# Patient Record
Sex: MALE | Race: WHITE | NOT HISPANIC OR LATINO | Employment: FULL TIME | URBAN - METROPOLITAN AREA
[De-identification: names, ages, dates, MRNs, and addresses within clinical notes are randomized per-mention and may not be internally consistent; named-entity substitution may affect disease eponyms.]

---

## 2020-02-22 ENCOUNTER — OFFICE VISIT (OUTPATIENT)
Dept: INTERNAL MEDICINE | Facility: CLINIC | Age: 55
End: 2020-02-22

## 2020-02-22 VITALS
HEIGHT: 68 IN | BODY MASS INDEX: 44.86 KG/M2 | SYSTOLIC BLOOD PRESSURE: 186 MMHG | HEART RATE: 78 BPM | DIASTOLIC BLOOD PRESSURE: 90 MMHG | OXYGEN SATURATION: 99 % | WEIGHT: 296 LBS

## 2020-02-22 DIAGNOSIS — N52.8 OTHER MALE ERECTILE DYSFUNCTION: ICD-10-CM

## 2020-02-22 DIAGNOSIS — I10 ESSENTIAL HYPERTENSION: ICD-10-CM

## 2020-02-22 DIAGNOSIS — Z79.890 LONG-TERM CURRENT USE OF TESTOSTERONE CYPIONATE: ICD-10-CM

## 2020-02-22 DIAGNOSIS — G47.33 OBSTRUCTIVE SLEEP APNEA: ICD-10-CM

## 2020-02-22 DIAGNOSIS — E78.2 MIXED HYPERLIPIDEMIA: ICD-10-CM

## 2020-02-22 DIAGNOSIS — E11.9 TYPE 2 DIABETES MELLITUS WITHOUT COMPLICATION, WITHOUT LONG-TERM CURRENT USE OF INSULIN (HCC): Primary | ICD-10-CM

## 2020-02-22 DIAGNOSIS — K59.4 PROCTALGIA FUGAX: ICD-10-CM

## 2020-02-22 LAB — HBA1C MFR BLD: 6.9 %

## 2020-02-22 PROCEDURE — 99204 OFFICE O/P NEW MOD 45 MIN: CPT | Performed by: NURSE PRACTITIONER

## 2020-02-22 PROCEDURE — 83036 HEMOGLOBIN GLYCOSYLATED A1C: CPT | Performed by: NURSE PRACTITIONER

## 2020-02-22 RX ORDER — ICOSAPENT ETHYL 1000 MG/1
2 CAPSULE ORAL DAILY
Qty: 180 CAPSULE | Refills: 0 | Status: SHIPPED | OUTPATIENT
Start: 2020-02-22 | End: 2020-09-05 | Stop reason: SDUPTHER

## 2020-02-22 RX ORDER — GLYBURIDE 1.25 MG/1
1.25 TABLET ORAL 2 TIMES DAILY WITH MEALS
Qty: 180 TABLET | Refills: 0 | Status: SHIPPED | OUTPATIENT
Start: 2020-02-22 | End: 2020-09-05 | Stop reason: SDUPTHER

## 2020-02-22 RX ORDER — SILDENAFIL CITRATE 20 MG/1
20 TABLET ORAL AS NEEDED
COMMUNITY
End: 2020-02-22 | Stop reason: SDUPTHER

## 2020-02-22 RX ORDER — LISINOPRIL 20 MG/1
TABLET ORAL
Qty: 270 TABLET | Refills: 0 | Status: SHIPPED | OUTPATIENT
Start: 2020-02-22 | End: 2020-09-05

## 2020-02-22 RX ORDER — FENOFIBRATE 160 MG/1
160 TABLET ORAL DAILY
Qty: 90 TABLET | Refills: 3 | Status: CANCELLED | OUTPATIENT
Start: 2020-02-22

## 2020-02-22 RX ORDER — ATORVASTATIN CALCIUM 20 MG/1
20 TABLET, FILM COATED ORAL NIGHTLY
Qty: 90 TABLET | Refills: 0 | Status: SHIPPED | OUTPATIENT
Start: 2020-02-22 | End: 2020-09-05 | Stop reason: SDUPTHER

## 2020-02-22 RX ORDER — ALBUTEROL SULFATE 2.5 MG/3ML
2.5 SOLUTION RESPIRATORY (INHALATION) EVERY 4 HOURS PRN
COMMUNITY
End: 2020-02-26

## 2020-02-22 RX ORDER — FENOFIBRATE 160 MG/1
160 TABLET ORAL DAILY
COMMUNITY
End: 2020-03-03 | Stop reason: RX

## 2020-02-22 RX ORDER — GLIPIZIDE 2.5 MG/1
1.25 TABLET, EXTENDED RELEASE ORAL DAILY
COMMUNITY
End: 2020-02-22

## 2020-02-22 RX ORDER — TESTOSTERONE 200 MG
PELLET (EA) IMPLANTATION
COMMUNITY

## 2020-02-22 RX ORDER — METFORMIN HYDROCHLORIDE 500 MG/1
2000 TABLET, EXTENDED RELEASE ORAL
Qty: 360 TABLET | Refills: 0 | Status: SHIPPED | OUTPATIENT
Start: 2020-02-22 | End: 2020-09-05 | Stop reason: RX

## 2020-02-22 RX ORDER — ATORVASTATIN CALCIUM 20 MG/1
20 TABLET, FILM COATED ORAL DAILY
COMMUNITY
End: 2020-02-22 | Stop reason: SDUPTHER

## 2020-02-22 RX ORDER — LISINOPRIL 20 MG/1
20 TABLET ORAL 2 TIMES DAILY
COMMUNITY
End: 2020-02-22 | Stop reason: SDUPTHER

## 2020-02-22 RX ORDER — SILDENAFIL CITRATE 20 MG/1
100 TABLET ORAL DAILY PRN
Qty: 90 TABLET | Refills: 2 | Status: SHIPPED | OUTPATIENT
Start: 2020-02-22 | End: 2020-09-05 | Stop reason: SDUPTHER

## 2020-02-22 NOTE — PROGRESS NOTES
"Chief Complaint   Patient presents with   • Establish Care   • Diabetes     dx 2008    • Hyperlipidemia   • Hypertension   • Erectile Dysfunction   • Rectal Pain   • Sleep Apnea       History of Present Illness  54 y.o.male presents for establish care and management of acute chronic conditions.  Prior primary care provider [facility]avan [place].  Prior to that he went to [facility] in [place].    T2DM since 2008, Hgb A1C has always been well maintained in the \"6s\". After not working his HgbA1C went up to 9%. He started exercising and losing weight. Went to a health fair about 2-3 months ago and his HgbA1C was 5.6 %. His current job is sedentary as a .    Last annual physical: doesn't remember    Wears bilateral hearing aids: has not seen ENT, saw \"hearing lab\" at [facility] eye exam: Dec 2019  Last colonoscopy: 2016; one polyp 5 yr return  Endoscopy: 2016; nml  Immunizations current: Flu vaccine UTD, MMR, TDap, Shingrix, Hep A and B. He doesn't know what Pneumonia vaccine he received, but it was a \"while ago\"  Tobacco use: none; has never  Alcohol use: Occassional drinker, 3-4/month    Diabetes   He presents for his follow-up diabetic visit. He has type 2 diabetes mellitus. The initial diagnosis of diabetes was made 12 years ago. His disease course has been stable. Hypoglycemia symptoms include sweats and tremors. Pertinent negatives for hypoglycemia include no dizziness, headaches, hunger or nervousness/anxiousness. (Occasionally has low blood sugars only when he takes his glyburide and doesn't eat.) Pertinent negatives for diabetes include no blurred vision, no chest pain, no fatigue, no foot paresthesias, no foot ulcerations, no polydipsia, no polyphagia, no polyuria and no visual change. There are no hypoglycemic complications. Symptoms are stable. Risk factors for coronary artery disease include diabetes mellitus, dyslipidemia, male sex, obesity, sedentary lifestyle and " "stress. Current diabetic treatment includes oral agent (dual therapy). He is compliant with treatment none of the time. Exercise: has new job, so he has not been able to exericse. There is no change in his home blood glucose trend. (Doesn't really check Blood sugars that often. ) An ACE inhibitor/angiotensin II receptor blocker is being taken. He does not see a podiatrist.Eye exam is current.   Hypertension   This is a chronic problem. The current episode started more than 1 year ago. The problem is uncontrolled. Associated symptoms include sweats. Pertinent negatives include no blurred vision, chest pain, headaches, palpitations, peripheral edema or shortness of breath. Risk factors for coronary artery disease include diabetes mellitus, dyslipidemia, family history, obesity, male gender and sedentary lifestyle. Current antihypertension treatment includes ACE inhibitors. Improvement on treatment: at home it usually runs 120-130s/70s.   Hyperlipidemia   This is a chronic problem. The current episode started more than 1 year ago. Exacerbating diseases include diabetes and obesity. Pertinent negatives include no chest pain, myalgias or shortness of breath. Current antihyperlipidemic treatment includes fibric acid derivatives and statins (Has tried Lovesa that has worked good before).     Low testosterone: Dr. Pacheco has Rxd in the past. He was told that if he took testosterone and Vitamin D it would help control his diabetes.    ED: Has been on sildenafil for 4-5 years. It works well for him. Takes 5 of the 20 mg at a time to equal 100 mg. (Was previously on Viagra 100 mg but it was too expensive). No chest pain, shortness of breath, or headaches. Side effects he experiences are stuffy nose and a \"warm feeling\".    EDUARDO on Bipap, well controlled. Sleeps well. Had recent visit with sleep medicine    Rectal spasms Onset 2016, pt starting have \"pain and muscle spasms in his rectum/anus. Saw GI who diagnosed him with " proctalgia fugax. He uses 2 puffs of albuterol as needed for these spasms.     Review of Systems   Constitutional: Negative for chills, diaphoresis, fatigue and fever.   Eyes: Negative for blurred vision and visual disturbance.   Respiratory: Negative for cough, chest tightness and shortness of breath.    Cardiovascular: Negative for chest pain and palpitations.   Gastrointestinal: Negative for abdominal pain, constipation, diarrhea, nausea, vomiting and GERD.   Endocrine: Negative for polydipsia, polyphagia and polyuria.   Genitourinary: Positive for erectile dysfunction. Negative for difficulty urinating, dysuria, hematuria, urgency and urinary incontinence.   Musculoskeletal: Negative for arthralgias, back pain and myalgias.   Skin: Negative for skin lesions.   Neurological: Positive for tremors. Negative for dizziness, light-headedness and headache.   Psychiatric/Behavioral: Negative for depressed mood. The patient is not nervous/anxious.          [facility]  The following portions of the patient's history were reviewed and updated as appropriate: allergies, current medications, past family history, past medical history, past social history, past surgical history and problem list.     Past Medical History:   Diagnosis Date   • Diabetes mellitus (CMS/HCC)    • ED (erectile dysfunction)    • Hyperlipidemia    • Hypertension    • Rectal spasm    • Sleep apnea       Past Surgical History:   Procedure Laterality Date   • COLONOSCOPY     • HERNIA REPAIR     • SHOULDER ACROMIOCLAVICULAR JOINT REPAIR  2016      No Known Allergies   Social History     Socioeconomic History   • Marital status:      Spouse name: Not on file   • Number of children: Not on file   • Years of education: Not on file   • Highest education level: Not on file   Tobacco Use   • Smoking status: Never Smoker   • Smokeless tobacco: Never Used   Substance and Sexual Activity   • Alcohol use: Never     Frequency: Never   • Drug use: Never   • Sexual  "activity: Yes     Partners: Female     Family History   Adopted: Yes   Family history unknown: Yes            Current Outpatient Medications:   •  albuterol (PROVENTIL) (2.5 MG/3ML) 0.083% nebulizer solution, Take 2.5 mg by nebulization Every 4 (Four) Hours As Needed for Wheezing., Disp: , Rfl:   •  atorvastatin (LIPITOR) 20 MG tablet, Take 1 tablet by mouth Every Night., Disp: 90 tablet, Rfl: 0  •  fenofibrate 160 MG tablet, Take 160 mg by mouth Daily., Disp: , Rfl:   •  lisinopril (PRINIVIL,ZESTRIL) 20 MG tablet, Take 2 tab by mouth in mornings and 1 tab at night; daily. Goal BP <130/80, Disp: 270 tablet, Rfl: 0  •  metFORMIN (GLUCOPHAGE) 500 MG tablet, Take 500 mg by mouth 2 (Two) Times a Day With Meals., Disp: , Rfl:   •  sildenafil (REVATIO) 20 MG tablet, Take 5 tablets by mouth Daily As Needed (ED)., Disp: 90 tablet, Rfl: 2  •  Testosterone 200 MG pellet, by Implant route., Disp: , Rfl:   •  glyburide (DIAbeta) 1.25 MG tablet, Take 1 tablet by mouth 2 (Two) Times a Day With Meals., Disp: 180 tablet, Rfl: 0  •  icosapent ethyl (VASCEPA) 1 g capsule capsule, Take 2 g by mouth Daily., Disp: 180 capsule, Rfl: 0  •  metFORMIN ER (GLUCOPHAGE-XR) 500 MG 24 hr tablet, Take 4 tablets by mouth Daily With Breakfast., Disp: 360 tablet, Rfl: 0    VITALS:  BP (!) 186/90   Pulse 78   Ht 172.7 cm (68\")   Wt 134 kg (296 lb)   SpO2 99%   BMI 45.01 kg/m²   Recheck 161/89  Physical Exam   Constitutional: He is oriented to person, place, and time. He appears well-developed and well-nourished. No distress.   HENT:   Head: Normocephalic and atraumatic.   Right Ear: Tympanic membrane, external ear and ear canal normal.   Left Ear: Tympanic membrane, external ear and ear canal normal.   Nose: Nose normal.   Mouth/Throat: Oropharynx is clear and moist.   Bilateral hearing aids   Eyes: Pupils are equal, round, and reactive to light. Conjunctivae and EOM are normal. Right eye exhibits no discharge. Left eye exhibits no discharge. "   Neck: Normal range of motion. Neck supple. No thyromegaly present.   Cardiovascular: Normal rate, regular rhythm, normal heart sounds and intact distal pulses.   No edema   Pulmonary/Chest: Effort normal and breath sounds normal. No respiratory distress.   Abdominal: Soft. Bowel sounds are normal. There is no tenderness.   Lymphadenopathy:     He has no cervical adenopathy.   Neurological: He is alert and oriented to person, place, and time.   Skin: Skin is warm and dry. Capillary refill takes less than 2 seconds. No rash noted.   Psychiatric: He has a normal mood and affect. His behavior is normal.   Vitals reviewed.      LABS  Results for orders placed or performed in visit on 02/22/20   POC Glycosylated Hemoglobin (Hb A1C)   Result Value Ref Range    Hemoglobin A1C 6.9 %       ASSESSMENT/PLAN  Harpreet was seen today for establish care, diabetes, hyperlipidemia, hypertension, erectile dysfunction, rectal pain and sleep apnea.    Diagnoses and all orders for this visit:    Type 2 diabetes mellitus without complication, without long-term current use of insulin (CMS/[facility])  Comments:  Continue current dose of medication. Avoid sugary foods/drinks, limit carbs. Goal A1C <6.5%  Orders:  -     POC Glycosylated Hemoglobin (Hb A1C)  -     lisinopril (PRINIVIL,ZESTRIL) 20 MG tablet; Take 2 tab by mouth in mornings and 1 tab at night; daily.  -     metFORMIN ER (GLUCOPHAGE-XR) 500 MG 24 hr tablet; Take 4 tablets by mouth Daily With Breakfast.  -     glyburide (DIAbeta) 1.25 MG tablet; Take 1 tablet by mouth 2 (Two) Times a Day With Meals.  -     Comprehensive Metabolic Panel; Future  -     Microalbumin / Creatinine Urine Ratio - Urine, Clean Catch  -     Lancets (ONETOUCH ULTRASOFT) lancets; Use as instructed to check blood glucose 3 times per day.    Essential hypertension  Comments:  Uncontrolled. Increased Lisinopril. DASH diet information given, recommended exercise of 150 min/week. Low sodium diet <1500mg/day. Goal <  130/80  Orders:  -     lisinopril (PRINIVIL,ZESTRIL) 20 MG tablet; Take 2 tab by mouth in mornings and 1 tab at night; daily. Goal BP <130/80.  Advised pt may increase to 2 tabs in evenings to equal 20mg BID if not at goal BP.  -     Comprehensive Metabolic Panel; Future  -     Microalbumin / Creatinine Urine Ratio - Urine, Clean Catch    Mixed hyperlipidemia  Comments:  Labs pending; will notify pt with results. Avoid fried, fatty foods.  Orders:  -     atorvastatin (LIPITOR) 20 MG tablet; Take 1 tablet by mouth Every Night.  -     Lipid Panel; Future  -     TSH Rfx On Abnormal To Free T4; Future  -     icosapent ethyl (VASCEPA) 1 g capsule capsule; Take 2 g by mouth Daily.  Pt reported improved results while taking lovaza.  Have vascepa sample in office. Will try the vacepa instead of the fenofibrate.  If insurance does not cover will go back to the finofibrate with the statin.  Other male erectile dysfunction  Comments:  Educated pt on red flags of headache, chest pain or SOB. If erection lasts >4 hrs he needs to go to ED. He expressed understanding.  Orders:  -     sildenafil (REVATIO) 20 MG tablet; Take 5 tablets by mouth Daily As Needed (ED).    Obstructive sleep apnea  Cont cpap; FU with sleep medicine.  Proctalgia fugax  Inhaler as previously directed  Long-term current use of testosterone cypionate  -     CBC & Differential; Future  -     Comprehensive Metabolic Panel; Future  -     Lipid Panel; Future  -     PSA Screen; Future  -     Testosterone, free, total  I have explained I do not prescribe testosterone replacement. I would refer to [facility] if he wishes to continue.  Advised of risks benefits.    I discussed the patients findings and my recommendations with patient.  Patient was encouraged to keep me informed of any acute changes, lack of improvement, or any new concerning symptoms.  Patient voiced understanding of all instructions and denied further questions.      FOLLOW-UP  Return in  about 3 months (around 5/22/2020), or if symptoms worsen or fail to improve, for Recheck.    Electronically signed by:    PHILIPPE Escalante  02/22/2020    EMR Dragon/Transcription Disclaimer:  Much of this encounter note is an electronic transcription/translation of spoken language to printed text.  The electronic translation of spoken language may permit erroneous, or at times, nonsensical words or phrases to be inadvertently transcribed.  Although I have reviewed the note for such errors, some may still exist

## 2020-02-22 NOTE — PATIENT INSTRUCTIONS
"DASH Eating Plan  DASH stands for \"Dietary Approaches to Stop Hypertension.\" The DASH eating plan is a healthy eating plan that has been shown to reduce high blood pressure (hypertension). It may also reduce your risk for type 2 diabetes, heart disease, and stroke. The DASH eating plan may also help with weight loss.  What are tips for following this plan?    General guidelines  · Avoid eating more than 2,300 mg (milligrams) of salt (sodium) a day. If you have hypertension, you may need to reduce your sodium intake to 1,500 mg a day.  · Limit alcohol intake to no more than 1 drink a day for nonpregnant women and 2 drinks a day for men. One drink equals 12 oz of beer, 5 oz of wine, or 1½ oz of hard liquor.  · Work with your health care provider to maintain a healthy body weight or to lose weight. Ask what an ideal weight is for you.  · Get at least 30 minutes of exercise that causes your heart to beat faster (aerobic exercise) most days of the week. Activities may include walking, swimming, or biking.  · Work with your health care provider or diet and nutrition specialist (dietitian) to adjust your eating plan to your individual calorie needs.  Reading food labels    · Check food labels for the amount of sodium per serving. Choose foods with less than 5 percent of the Daily Value of sodium. Generally, foods with less than 300 mg of sodium per serving fit into this eating plan.  · To find whole grains, look for the word \"whole\" as the first word in the ingredient list.  Shopping  · Buy products labeled as \"low-sodium\" or \"no salt added.\"  · Buy fresh foods. Avoid canned foods and premade or frozen meals.  Cooking  · Avoid adding salt when cooking. Use salt-free seasonings or herbs instead of table salt or sea salt. Check with your health care provider or pharmacist before using salt substitutes.  · Do not preciado foods. Cook foods using healthy methods such as baking, boiling, grilling, and broiling instead.  · Cook with " heart-healthy oils, such as olive, canola, soybean, or sunflower oil.  Meal planning  · Eat a balanced diet that includes:  ? 5 or more servings of fruits and vegetables each day. At each meal, try to fill half of your plate with fruits and vegetables.  ? Up to 6-8 servings of whole grains each day.  ? Less than 6 oz of lean meat, poultry, or fish each day. A 3-oz serving of meat is about the same size as a deck of cards. One egg equals 1 oz.  ? 2 servings of low-fat dairy each day.  ? A serving of nuts, seeds, or beans 5 times each week.  ? Heart-healthy fats. Healthy fats called Omega-3 fatty acids are found in foods such as flaxseeds and coldwater fish, like sardines, salmon, and mackerel.  · Limit how much you eat of the following:  ? Canned or prepackaged foods.  ? Food that is high in trans fat, such as fried foods.  ? Food that is high in saturated fat, such as fatty meat.  ? Sweets, desserts, sugary drinks, and other foods with added sugar.  ? Full-fat dairy products.  · Do not salt foods before eating.  · Try to eat at least 2 vegetarian meals each week.  · Eat more home-cooked food and less restaurant, buffet, and fast food.  · When eating at a restaurant, ask that your food be prepared with less salt or no salt, if possible.  What foods are recommended?  The items listed may not be a complete list. Talk with your dietitian about what dietary choices are best for you.  Grains  Whole-grain or whole-wheat bread. Whole-grain or whole-wheat pasta. Brown rice. Oatmeal. Quinoa. Bulgur. Whole-grain and low-sodium cereals. Cydney bread. Low-fat, low-sodium crackers. Whole-wheat flour tortillas.  Vegetables  Fresh or frozen vegetables (raw, steamed, roasted, or grilled). Low-sodium or reduced-sodium tomato and vegetable juice. Low-sodium or reduced-sodium tomato sauce and tomato paste. Low-sodium or reduced-sodium canned vegetables.  Fruits  All fresh, dried, or frozen fruit. Canned fruit in natural juice (without  added sugar).  Meat and other protein foods  Skinless chicken or turkey. Ground chicken or turkey. Pork with fat trimmed off. Fish and seafood. Egg whites. Dried beans, peas, or lentils. Unsalted nuts, nut butters, and seeds. Unsalted canned beans. Lean cuts of beef with fat trimmed off. Low-sodium, lean deli meat.  Dairy  Low-fat (1%) or fat-free (skim) milk. Fat-free, low-fat, or reduced-fat cheeses. Nonfat, low-sodium ricotta or cottage cheese. Low-fat or nonfat yogurt. Low-fat, low-sodium cheese.  Fats and oils  Soft margarine without trans fats. Vegetable oil. Low-fat, reduced-fat, or light mayonnaise and salad dressings (reduced-sodium). Canola, safflower, olive, soybean, and sunflower oils. Avocado.  Seasoning and other foods  Herbs. Spices. Seasoning mixes without salt. Unsalted popcorn and pretzels. Fat-free sweets.  What foods are not recommended?  The items listed may not be a complete list. Talk with your dietitian about what dietary choices are best for you.  Grains  Baked goods made with fat, such as croissants, muffins, or some breads. Dry pasta or rice meal packs.  Vegetables  Creamed or fried vegetables. Vegetables in a cheese sauce. Regular canned vegetables (not low-sodium or reduced-sodium). Regular canned tomato sauce and paste (not low-sodium or reduced-sodium). Regular tomato and vegetable juice (not low-sodium or reduced-sodium). Pickles. Olives.  Fruits  Canned fruit in a light or heavy syrup. Fried fruit. Fruit in cream or butter sauce.  Meat and other protein foods  Fatty cuts of meat. Ribs. Fried meat. Hunter. Sausage. Bologna and other processed lunch meats. Salami. Fatback. Hotdogs. Bratwurst. Salted nuts and seeds. Canned beans with added salt. Canned or smoked fish. Whole eggs or egg yolks. Chicken or turkey with skin.  Dairy  Whole or 2% milk, cream, and half-and-half. Whole or full-fat cream cheese. Whole-fat or sweetened yogurt. Full-fat cheese. Nondairy creamers. Whipped toppings.  Processed cheese and cheese spreads.  Fats and oils  Butter. Stick margarine. Lard. Shortening. Ghee. Hunter fat. Tropical oils, such as coconut, palm kernel, or palm oil.  Seasoning and other foods  Salted popcorn and pretzels. Onion salt, garlic salt, seasoned salt, table salt, and sea salt. Worcestershire sauce. Tartar sauce. Barbecue sauce. Teriyaki sauce. Soy sauce, including reduced-sodium. Steak sauce. Canned and packaged gravies. Fish sauce. Oyster sauce. Cocktail sauce. Horseradish that you find on the shelf. Ketchup. Mustard. Meat flavorings and tenderizers. Bouillon cubes. Hot sauce and Tabasco sauce. Premade or packaged marinades. Premade or packaged taco seasonings. Relishes. Regular salad dressings.  Where to find more information:  · National Heart, Lung, and Blood [place]: www.nhlbi.nih.gov  · American Heart Association: www.heart.org  Summary  · The DASH eating plan is a healthy eating plan that has been shown to reduce high blood pressure (hypertension). It may also reduce your risk for type 2 diabetes, heart disease, and stroke.  · With the DASH eating plan, you should limit salt (sodium) intake to 2,300 mg a day. If you have hypertension, you may need to reduce your sodium intake to 1,500 mg a day.  · When on the DASH eating plan, aim to eat more fresh fruits and vegetables, whole grains, lean proteins, low-fat dairy, and heart-healthy fats.  · Work with your health care provider or diet and nutrition specialist (dietitian) to adjust your eating plan to your individual calorie needs.  This information is not intended to replace advice given to you by your health care provider. Make sure you discuss any questions you have with your health care provider.  Document Released: 12/06/2012 Document Revised: 12/11/2017 Document Reviewed: 12/11/2017  [employer] Interactive Patient Education © 2020 [employer]

## 2020-02-23 RX ORDER — LANCETS
EACH MISCELLANEOUS
Qty: 100 EACH | Refills: 12 | Status: SHIPPED | OUTPATIENT
Start: 2020-02-23 | End: 2020-02-28

## 2020-02-24 ENCOUNTER — APPOINTMENT (OUTPATIENT)
Dept: LAB | Facility: HOSPITAL | Age: 55
End: 2020-02-24

## 2020-02-24 DIAGNOSIS — E11.9 TYPE 2 DIABETES MELLITUS WITHOUT COMPLICATION, WITHOUT LONG-TERM CURRENT USE OF INSULIN (HCC): Primary | ICD-10-CM

## 2020-02-24 PROCEDURE — 82043 UR ALBUMIN QUANTITATIVE: CPT | Performed by: NURSE PRACTITIONER

## 2020-02-24 PROCEDURE — 82570 ASSAY OF URINE CREATININE: CPT | Performed by: NURSE PRACTITIONER

## 2020-02-25 LAB
ALBUMIN UR-MCNC: <1.2 MG/DL
CREAT UR-MCNC: 156.8 MG/DL
MICROALBUMIN/CREAT UR: NORMAL MG/G{CREAT}

## 2020-02-26 LAB
ALBUMIN SERPL-MCNC: 4.6 G/DL (ref 3.8–4.9)
ALBUMIN/GLOB SERPL: 1.9 {RATIO} (ref 1.2–2.2)
ALP SERPL-CCNC: 51 IU/L (ref 39–117)
ALT SERPL-CCNC: 37 IU/L (ref 0–44)
AST SERPL-CCNC: 26 IU/L (ref 0–40)
BASOPHILS # BLD AUTO: 0 X10E3/UL (ref 0–0.2)
BASOPHILS NFR BLD AUTO: 0 %
BILIRUB SERPL-MCNC: 0.3 MG/DL (ref 0–1.2)
BUN SERPL-MCNC: 17 MG/DL (ref 6–24)
BUN/CREAT SERPL: 16 (ref 9–20)
CALCIUM SERPL-MCNC: 9.5 MG/DL (ref 8.7–10.2)
CHLORIDE SERPL-SCNC: 100 MMOL/L (ref 96–106)
CHOLEST SERPL-MCNC: 151 MG/DL (ref 100–199)
CO2 SERPL-SCNC: 25 MMOL/L (ref 20–29)
CREAT SERPL-MCNC: 1.04 MG/DL (ref 0.76–1.27)
EOSINOPHIL # BLD AUTO: 0 X10E3/UL (ref 0–0.4)
EOSINOPHIL NFR BLD AUTO: 1 %
ERYTHROCYTE [DISTWIDTH] IN BLOOD BY AUTOMATED COUNT: 13.7 % (ref 11.6–15.4)
GLOBULIN SER CALC-MCNC: 2.4 G/DL (ref 1.5–4.5)
GLUCOSE SERPL-MCNC: 120 MG/DL (ref 65–99)
HCT VFR BLD AUTO: 41.4 % (ref 37.5–51)
HDLC SERPL-MCNC: 38 MG/DL
HGB BLD-MCNC: 13.7 G/DL (ref 13–17.7)
IMM GRANULOCYTES # BLD AUTO: 0 X10E3/UL (ref 0–0.1)
IMM GRANULOCYTES NFR BLD AUTO: 0 %
LDLC SERPL CALC-MCNC: 76 MG/DL (ref 0–99)
LYMPHOCYTES # BLD AUTO: 2 X10E3/UL (ref 0.7–3.1)
LYMPHOCYTES NFR BLD AUTO: 28 %
MCH RBC QN AUTO: 28.2 PG (ref 26.6–33)
MCHC RBC AUTO-ENTMCNC: 33.1 G/DL (ref 31.5–35.7)
MCV RBC AUTO: 85 FL (ref 79–97)
MONOCYTES # BLD AUTO: 0.8 X10E3/UL (ref 0.1–0.9)
MONOCYTES NFR BLD AUTO: 11 %
NEUTROPHILS # BLD AUTO: 4.3 X10E3/UL (ref 1.4–7)
NEUTROPHILS NFR BLD AUTO: 60 %
PLATELET # BLD AUTO: 258 X10E3/UL (ref 150–450)
POTASSIUM SERPL-SCNC: 4.5 MMOL/L (ref 3.5–5.2)
PROT SERPL-MCNC: 7 G/DL (ref 6–8.5)
PSA FREE MFR SERPL: 44.4 %
PSA FREE SERPL-MCNC: 0.4 NG/ML
PSA SERPL-MCNC: 0.9 NG/ML (ref 0–4)
RBC # BLD AUTO: 4.85 X10E6/UL (ref 4.14–5.8)
SODIUM SERPL-SCNC: 139 MMOL/L (ref 134–144)
TRIGL SERPL-MCNC: 184 MG/DL (ref 0–149)
TSH SERPL DL<=0.005 MIU/L-ACNC: 2.11 UIU/ML (ref 0.45–4.5)
VLDLC SERPL CALC-MCNC: 37 MG/DL (ref 5–40)
WBC # BLD AUTO: 7.1 X10E3/UL (ref 3.4–10.8)

## 2020-02-26 RX ORDER — ALBUTEROL SULFATE 90 UG/1
2 AEROSOL, METERED RESPIRATORY (INHALATION) EVERY 4 HOURS PRN
Qty: 1 INHALER | Refills: 11 | Status: SHIPPED | OUTPATIENT
Start: 2020-02-26 | End: 2020-09-08 | Stop reason: SDUPTHER

## 2020-02-27 ENCOUNTER — TELEPHONE (OUTPATIENT)
Dept: INTERNAL MEDICINE | Facility: CLINIC | Age: 55
End: 2020-02-27

## 2020-02-27 NOTE — TELEPHONE ENCOUNTER
Called sasha and his insurance accepts acucheck augustina plus, test and lancets would be soft clcik

## 2020-02-27 NOTE — TELEPHONE ENCOUNTER
----- Message from PHILIPPE Dickerson sent at 2/26/2020  2:37 PM EST -----  Call your pharmacy and ask what glucometer and test strips does passoport cover for pt?

## 2020-02-28 ENCOUNTER — TELEPHONE (OUTPATIENT)
Dept: INTERNAL MEDICINE | Facility: CLINIC | Age: 55
End: 2020-02-28

## 2020-02-28 DIAGNOSIS — E11.9 TYPE 2 DIABETES MELLITUS WITHOUT COMPLICATION, WITHOUT LONG-TERM CURRENT USE OF INSULIN (HCC): ICD-10-CM

## 2020-02-28 RX ORDER — LANCETS
EACH MISCELLANEOUS
Qty: 100 EACH | Refills: 12 | Status: SHIPPED | OUTPATIENT
Start: 2020-02-28 | End: 2021-02-27

## 2020-02-28 RX ORDER — BLOOD-GLUCOSE METER
1 EACH MISCELLANEOUS ONCE
Qty: 1 KIT | Refills: 0 | Status: SHIPPED | OUTPATIENT
Start: 2020-02-28 | End: 2020-02-28

## 2020-02-28 NOTE — TELEPHONE ENCOUNTER
Patient states that the end of the day his health insurance expires.  He needs to see what the status is on the Vascepa authorization.  Since his lab work showed he has high cholesterol should he take a higher dose of Lipitor?  He and be reached at [phone]

## 2020-02-29 ENCOUNTER — TELEPHONE (OUTPATIENT)
Dept: INTERNAL MEDICINE | Facility: CLINIC | Age: 55
End: 2020-02-29

## 2020-05-06 DIAGNOSIS — I10 ESSENTIAL HYPERTENSION: ICD-10-CM

## 2020-05-06 DIAGNOSIS — E78.2 MIXED HYPERLIPIDEMIA: ICD-10-CM

## 2020-05-06 DIAGNOSIS — E11.9 TYPE 2 DIABETES MELLITUS WITHOUT COMPLICATION, WITHOUT LONG-TERM CURRENT USE OF INSULIN (HCC): ICD-10-CM

## 2020-05-06 RX ORDER — LISINOPRIL 20 MG/1
TABLET ORAL
Qty: 270 TABLET | Refills: 0 | OUTPATIENT
Start: 2020-05-06

## 2020-05-06 RX ORDER — METFORMIN HYDROCHLORIDE 500 MG/1
TABLET, EXTENDED RELEASE ORAL
Qty: 360 TABLET | Refills: 0 | OUTPATIENT
Start: 2020-05-06

## 2020-05-06 RX ORDER — ATORVASTATIN CALCIUM 20 MG/1
TABLET, FILM COATED ORAL
Qty: 90 TABLET | Refills: 0 | OUTPATIENT
Start: 2020-05-06

## 2020-08-19 ENCOUNTER — TELEPHONE (OUTPATIENT)
Dept: INTERNAL MEDICINE | Facility: CLINIC | Age: 55
End: 2020-08-19

## 2020-08-19 NOTE — TELEPHONE ENCOUNTER
Hub to read - lvm with patient to r/s or do virtual visit with Denny. Provider will not be doing office visits but able to do virtual (telephone and mychart video)

## 2020-09-05 ENCOUNTER — OFFICE VISIT (OUTPATIENT)
Dept: INTERNAL MEDICINE | Facility: CLINIC | Age: 55
End: 2020-09-05

## 2020-09-05 VITALS
TEMPERATURE: 98 F | HEART RATE: 75 BPM | DIASTOLIC BLOOD PRESSURE: 84 MMHG | HEIGHT: 68 IN | RESPIRATION RATE: 16 BRPM | OXYGEN SATURATION: 94 % | BODY MASS INDEX: 46.13 KG/M2 | SYSTOLIC BLOOD PRESSURE: 158 MMHG | WEIGHT: 304.4 LBS

## 2020-09-05 DIAGNOSIS — E11.9 TYPE 2 DIABETES MELLITUS WITHOUT COMPLICATION, WITHOUT LONG-TERM CURRENT USE OF INSULIN (HCC): Primary | ICD-10-CM

## 2020-09-05 DIAGNOSIS — I10 ESSENTIAL HYPERTENSION: ICD-10-CM

## 2020-09-05 DIAGNOSIS — N52.8 OTHER MALE ERECTILE DYSFUNCTION: ICD-10-CM

## 2020-09-05 DIAGNOSIS — E78.2 MIXED HYPERLIPIDEMIA: ICD-10-CM

## 2020-09-05 LAB — HBA1C MFR BLD: 8.2 %

## 2020-09-05 PROCEDURE — 83036 HEMOGLOBIN GLYCOSYLATED A1C: CPT | Performed by: NURSE PRACTITIONER

## 2020-09-05 PROCEDURE — 99214 OFFICE O/P EST MOD 30 MIN: CPT | Performed by: NURSE PRACTITIONER

## 2020-09-05 RX ORDER — ATORVASTATIN CALCIUM 20 MG/1
20 TABLET, FILM COATED ORAL NIGHTLY
Qty: 90 TABLET | Refills: 3 | Status: SHIPPED | OUTPATIENT
Start: 2020-09-05 | End: 2021-03-06 | Stop reason: SDUPTHER

## 2020-09-05 RX ORDER — FENOFIBRATE 160 MG/1
160 TABLET ORAL DAILY
Qty: 90 TABLET | Refills: 1 | Status: SHIPPED | OUTPATIENT
Start: 2020-09-05 | End: 2021-03-06 | Stop reason: SDUPTHER

## 2020-09-05 RX ORDER — LISINOPRIL 20 MG/1
20 TABLET ORAL 2 TIMES DAILY
Qty: 270 TABLET | Refills: 0 | Status: SHIPPED | OUTPATIENT
Start: 2020-09-05 | End: 2020-09-17 | Stop reason: SDUPTHER

## 2020-09-05 RX ORDER — ICOSAPENT ETHYL 1000 MG/1
2 CAPSULE ORAL DAILY
Qty: 180 CAPSULE | Refills: 1 | Status: SHIPPED | OUTPATIENT
Start: 2020-09-05 | End: 2021-03-06 | Stop reason: SDUPTHER

## 2020-09-05 RX ORDER — LISINOPRIL AND HYDROCHLOROTHIAZIDE 25; 20 MG/1; MG/1
1 TABLET ORAL DAILY
Qty: 30 TABLET | Refills: 5 | Status: SHIPPED | OUTPATIENT
Start: 2020-09-05 | End: 2021-01-01 | Stop reason: SDUPTHER

## 2020-09-05 RX ORDER — LISINOPRIL 20 MG/1
TABLET ORAL
Qty: 270 TABLET | Refills: 0 | Status: CANCELLED | OUTPATIENT
Start: 2020-09-05

## 2020-09-05 RX ORDER — SILDENAFIL CITRATE 20 MG/1
100 TABLET ORAL DAILY PRN
Qty: 90 TABLET | Refills: 2 | Status: SHIPPED | OUTPATIENT
Start: 2020-09-05 | End: 2021-04-08 | Stop reason: SDUPTHER

## 2020-09-05 RX ORDER — GLYBURIDE 1.25 MG/1
1.25 TABLET ORAL 2 TIMES DAILY WITH MEALS
Qty: 180 TABLET | Refills: 1 | Status: SHIPPED | OUTPATIENT
Start: 2020-09-05 | End: 2021-03-06 | Stop reason: SDUPTHER

## 2020-09-05 NOTE — PATIENT INSTRUCTIONS
"Goal BP <130/80 Low sodium diet.    Blood sugar: fasting in morning goal <120.  2 hr after you eat goal <180  DASH Eating Plan  DASH stands for \"Dietary Approaches to Stop Hypertension.\" The DASH eating plan is a healthy eating plan that has been shown to reduce high blood pressure (hypertension). It may also reduce your risk for type 2 diabetes, heart disease, and stroke. The DASH eating plan may also help with weight loss.  What are tips for following this plan?    General guidelines  · Avoid eating more than 2,300 mg (milligrams) of salt (sodium) a day. If you have hypertension, you may need to reduce your sodium intake to 1,500 mg a day.  · Limit alcohol intake to no more than 1 drink a day for nonpregnant women and 2 drinks a day for men. One drink equals 12 oz of beer, 5 oz of wine, or 1½ oz of hard liquor.  · Work with your health care provider to maintain a healthy body weight or to lose weight. Ask what an ideal weight is for you.  · Get at least 30 minutes of exercise that causes your heart to beat faster (aerobic exercise) most days of the week. Activities may include walking, swimming, or biking.  · Work with your health care provider or diet and nutrition specialist (dietitian) to adjust your eating plan to your individual calorie needs.  Reading food labels    · Check food labels for the amount of sodium per serving. Choose foods with less than 5 percent of the Daily Value of sodium. Generally, foods with less than 300 mg of sodium per serving fit into this eating plan.  · To find whole grains, look for the word \"whole\" as the first word in the ingredient list.  Shopping  · Buy products labeled as \"low-sodium\" or \"no salt added.\"  · Buy fresh foods. Avoid canned foods and premade or frozen meals.  Cooking  · Avoid adding salt when cooking. Use salt-free seasonings or herbs instead of table salt or sea salt. Check with your health care provider or pharmacist before using salt substitutes.  · Do not preciado " foods. Cook foods using healthy methods such as baking, boiling, grilling, and broiling instead.  · Cook with heart-healthy oils, such as olive, canola, soybean, or sunflower oil.  Meal planning  · Eat a balanced diet that includes:  ? 5 or more servings of fruits and vegetables each day. At each meal, try to fill half of your plate with fruits and vegetables.  ? Up to 6-8 servings of whole grains each day.  ? Less than 6 oz of lean meat, poultry, or fish each day. A 3-oz serving of meat is about the same size as a deck of cards. One egg equals 1 oz.  ? 2 servings of low-fat dairy each day.  ? A serving of nuts, seeds, or beans 5 times each week.  ? Heart-healthy fats. Healthy fats called Omega-3 fatty acids are found in foods such as flaxseeds and coldwater fish, like sardines, salmon, and mackerel.  · Limit how much you eat of the following:  ? Canned or prepackaged foods.  ? Food that is high in trans fat, such as fried foods.  ? Food that is high in saturated fat, such as fatty meat.  ? Sweets, desserts, sugary drinks, and other foods with added sugar.  ? Full-fat dairy products.  · Do not salt foods before eating.  · Try to eat at least 2 vegetarian meals each week.  · Eat more home-cooked food and less restaurant, buffet, and fast food.  · When eating at a restaurant, ask that your food be prepared with less salt or no salt, if possible.  What foods are recommended?  The items listed may not be a complete list. Talk with your dietitian about what dietary choices are best for you.  Grains  Whole-grain or whole-wheat bread. Whole-grain or whole-wheat pasta. Brown rice. Oatmeal. Quinoa. Bulgur. Whole-grain and low-sodium cereals. Cydney bread. Low-fat, low-sodium crackers. Whole-wheat flour tortillas.  Vegetables  Fresh or frozen vegetables (raw, steamed, roasted, or grilled). Low-sodium or reduced-sodium tomato and vegetable juice. Low-sodium or reduced-sodium tomato sauce and tomato paste. Low-sodium or  reduced-sodium canned vegetables.  Fruits  All fresh, dried, or frozen fruit. Canned fruit in natural juice (without added sugar).  Meat and other protein foods  Skinless chicken or turkey. Ground chicken or turkey. Pork with fat trimmed off. Fish and seafood. Egg whites. Dried beans, peas, or lentils. Unsalted nuts, nut butters, and seeds. Unsalted canned beans. Lean cuts of beef with fat trimmed off. Low-sodium, lean deli meat.  Dairy  Low-fat (1%) or fat-free (skim) milk. Fat-free, low-fat, or reduced-fat cheeses. Nonfat, low-sodium ricotta or cottage cheese. Low-fat or nonfat yogurt. Low-fat, low-sodium cheese.  Fats and oils  Soft margarine without trans fats. Vegetable oil. Low-fat, reduced-fat, or light mayonnaise and salad dressings (reduced-sodium). Canola, safflower, olive, soybean, and sunflower oils. Avocado.  Seasoning and other foods  Herbs. Spices. Seasoning mixes without salt. Unsalted popcorn and pretzels. Fat-free sweets.  What foods are not recommended?  The items listed may not be a complete list. Talk with your dietitian about what dietary choices are best for you.  Grains  Baked goods made with fat, such as croissants, muffins, or some breads. Dry pasta or rice meal packs.  Vegetables  Creamed or fried vegetables. Vegetables in a cheese sauce. Regular canned vegetables (not low-sodium or reduced-sodium). Regular canned tomato sauce and paste (not low-sodium or reduced-sodium). Regular tomato and vegetable juice (not low-sodium or reduced-sodium). Pickles. Olives.  Fruits  Canned fruit in a light or heavy syrup. Fried fruit. Fruit in cream or butter sauce.  Meat and other protein foods  Fatty cuts of meat. Ribs. Fried meat. Hunter. Sausage. Bologna and other processed lunch meats. Salami. Fatback. Hotdogs. Bratwurst. Salted nuts and seeds. Canned beans with added salt. Canned or smoked fish. Whole eggs or egg yolks. Chicken or turkey with skin.  Dairy  Whole or 2% milk, cream, and half-and-half.  Whole or full-fat cream cheese. Whole-fat or sweetened yogurt. Full-fat cheese. Nondairy creamers. Whipped toppings. Processed cheese and cheese spreads.  Fats and oils  Butter. Stick margarine. Lard. Shortening. Ghee. Hunter fat. Tropical oils, such as coconut, palm kernel, or palm oil.  Seasoning and other foods  Salted popcorn and pretzels. Onion salt, garlic salt, seasoned salt, table salt, and sea salt. Worcestershire sauce. Tartar sauce. Barbecue sauce. Teriyaki sauce. Soy sauce, including reduced-sodium. Steak sauce. Canned and packaged gravies. Fish sauce. Oyster sauce. Cocktail sauce. Horseradish that you find on the shelf. Ketchup. Mustard. Meat flavorings and tenderizers. Bouillon cubes. Hot sauce and Tabasco sauce. Premade or packaged marinades. Premade or packaged taco seasonings. Relishes. Regular salad dressings.  Where to find more information:  · National Heart, Lung, and [employer] [place]: www.nhlbi.nih.gov  · American Heart Association: www.heart.org  Summary  · The DASH eating plan is a healthy eating plan that has been shown to reduce high blood pressure (hypertension). It may also reduce your risk for type 2 diabetes, heart disease, and stroke.  · With the DASH eating plan, you should limit salt (sodium) intake to 2,300 mg a day. If you have hypertension, you may need to reduce your sodium intake to 1,500 mg a day.  · When on the DASH eating plan, aim to eat more fresh fruits and vegetables, whole grains, lean proteins, low-fat dairy, and heart-healthy fats.  · Work with your health care provider or diet and nutrition specialist (dietitian) to adjust your eating plan to your individual calorie needs.  This information is not intended to replace advice given to you by your health care provider. Make sure you discuss any questions you have with your health care provider.  Document Released: 12/06/2012 Document Revised: 11/30/2018 Document Reviewed: 12/11/2017  Elsevier Patient Education © 2020  [employer]  Semaglutide injection solution  What is this medicine?  SEMAGLUTIDE (Mony a GLOO tide) is used to improve blood sugar control in adults with type 2 diabetes. This medicine may be used with other diabetes medicines. This drug may also reduce the risk of heart attack or stroke if you have type 2 diabetes and risk factors for heart disease.  This medicine may be used for other purposes; ask your health care provider or pharmacist if you have questions.  COMMON BRAND NAME(S): OZEMPIC  What should I tell my health care provider before I take this medicine?  They need to know if you have any of these conditions:  · endocrine tumors (MEN 2) or if someone in your family had these tumors  · eye disease, vision problems  · history of pancreatitis  · kidney disease  · stomach problems  · thyroid cancer or if someone in your family had thyroid cancer  · an unusual or allergic reaction to semaglutide, other medicines, foods, dyes, or preservatives  · pregnant or trying to get pregnant  · breast-feeding  How should I use this medicine?  This medicine is for injection under the skin of your upper leg (thigh), stomach area, or upper arm. It is given once every week (every 7 days). You will be taught how to prepare and give this medicine. Use exactly as directed. Take your medicine at regular intervals. Do not take it more often than directed.  If you use this medicine with insulin, you should inject this medicine and the insulin separately. Do not mix them together. Do not give the injections right next to each other. Change (rotate) injection sites with each injection.  It is important that you put your used needles and syringes in a special sharps container. Do not put them in a trash can. If you do not have a sharps container, call your pharmacist or healthcare provider to get one.  A special MedGuide will be given to you by the pharmacist with each prescription and refill. Be sure to read this information  carefully each time.  Talk to your pediatrician regarding the use of this medicine in children. Special care may be needed.  Overdosage: If you think you have taken too much of this medicine contact a poison control center or emergency room at once.  NOTE: This medicine is only for you. Do not share this medicine with others.  What if I miss a dose?  If you miss a dose, take it as soon as you can within 5 days after the missed dose. Then take your next dose at your regular weekly time. If it has been longer than 5 days after the missed dose, do not take the missed dose. Take the next dose at your regular time. Do not take double or extra doses. If you have questions about a missed dose, contact your health care provider for advice.  What may interact with this medicine?  · other medicines for diabetes  Many medications may cause changes in blood sugar, these include:  · alcohol containing beverages  · antiviral medicines for HIV or AIDS  · aspirin and aspirin-like drugs  · certain medicines for blood pressure, heart disease, irregular heart beat  · chromium  · diuretics  · female hormones, such as estrogens or progestins, birth control pills  · fenofibrate  · gemfibrozil  · isoniazid  · lanreotide  · male hormones or anabolic steroids  · MAOIs like Carbex, Eldepryl, Marplan, Nardil, and Parnate  · medicines for weight loss  · medicines for allergies, asthma, cold, or cough  · medicines for depression, anxiety, or psychotic disturbances  · niacin  · nicotine  · NSAIDs, medicines for pain and inflammation, like ibuprofen or naproxen  · octreotide  · pasireotide  · pentamidine  · phenytoin  · probenecid  · quinolone antibiotics such as ciprofloxacin, levofloxacin, ofloxacin  · some herbal dietary supplements  · steroid medicines such as prednisone or cortisone  · sulfamethoxazole; trimethoprim  · thyroid hormones  Some medications can hide the warning symptoms of low blood sugar (hypoglycemia). You may need to monitor  your blood sugar more closely if you are taking one of these medications. These include:  · beta-blockers, often used for high blood pressure or heart problems (examples include atenolol, metoprolol, propranolol)  · clonidine  · guanethidine  · reserpine  This list may not describe all possible interactions. Give your health care provider a list of all the medicines, herbs, non-prescription drugs, or dietary supplements you use. Also tell them if you smoke, drink alcohol, or use illegal drugs. Some items may interact with your medicine.  What should I watch for while using this medicine?  Visit your doctor or health care professional for regular checks on your progress.  Drink plenty of fluids while taking this medicine. Check with your doctor or health care professional if you get an attack of severe diarrhea, nausea, and vomiting. The loss of too much body fluid can make it dangerous for you to take this medicine.  A test called the HbA1C (A1C) will be monitored. This is a simple blood test. It measures your blood sugar control over the last 2 to 3 months. You will receive this test every 3 to 6 months.  Learn how to check your blood sugar. Learn the symptoms of low and high blood sugar and how to manage them.  Always carry a quick-source of sugar with you in case you have symptoms of low blood sugar. Examples include hard sugar candy or glucose tablets. Make sure others know that you can choke if you eat or drink when you develop serious symptoms of low blood sugar, such as seizures or unconsciousness. They must get medical help at once.  Tell your doctor or health care professional if you have high blood sugar. You might need to change the dose of your medicine. If you are sick or exercising more than usual, you might need to change the dose of your medicine.  Do not skip meals. Ask your doctor or health care professional if you should avoid alcohol. Many nonprescription cough and cold products contain sugar or  alcohol. These can affect blood sugar.  Pens should never be shared. Even if the needle is changed, sharing may result in passing of viruses like hepatitis or HIV.  Wear a medical ID bracelet or chain, and carry a card that describes your disease and details of your medicine and dosage times.  Do not become pregnant while taking this medicine. Women should inform their doctor if they wish to become pregnant or think they might be pregnant. There is a potential for serious side effects to an unborn child. Talk to your health care professional or pharmacist for more information.  What side effects may I notice from receiving this medicine?  Side effects that you should report to your doctor or health care professional as soon as possible:  · allergic reactions like skin rash, itching or hives, swelling of the face, lips, or tongue  · breathing problems  · changes in vision  · diarrhea that continues or is severe  · lump or swelling on the neck  · severe nausea  · signs and symptoms of infection like fever or chills; cough; sore throat; pain or trouble passing urine  · signs and symptoms of low blood sugar such as feeling anxious, confusion, dizziness, increased hunger, unusually weak or tired, sweating, shakiness, cold, irritable, headache, blurred vision, fast heartbeat, loss of consciousness  · signs and symptoms of kidney injury like trouble passing urine or change in the amount of urine  · trouble swallowing  · unusual stomach upset or pain  · vomiting  Side effects that usually do not require medical attention (report to your doctor or health care professional if they continue or are bothersome):  · constipation  · diarrhea  · nausea  · pain, redness, or irritation at site where injected  · stomach upset  This list may not describe all possible side effects. Call your doctor for medical advice about side effects. You may report side effects to FDA at 0-061-FDA-0672.  Where should I keep my medicine?  Keep out of  the reach of children.  Store unopened pens in a refrigerator between 2 and 8 degrees C (36 and 46 degrees F). Do not freeze. Protect from light and heat. After you first use the pen, it can be stored for 56 days at room temperature between 15 and 30 degrees C (59 and 86 degrees F) or in a refrigerator. Throw away your used pen after 56 days or after the expiration date, whichever comes first.  Do not store your pen with the needle attached. If the needle is left on, medicine may leak from the pen.  NOTE: This sheet is a summary. It may not cover all possible information. If you have questions about this medicine, talk to your doctor, pharmacist, or health care provider.  © 2020 [employer] (2020-01-17 14:25:32)

## 2020-09-05 NOTE — PROGRESS NOTES
Chief Complaint   Patient presents with   • Diabetes     follow up   • Hypertension   • Hyperlipidemia   • Erectile Dysfunction       History of Present Illness  54 y.o.male presents for diabetes, htn, hld follow up.  Last seen Feb 2020. Pt reports had meds left over and has been taking those.    Diabetes   He presents for his follow-up diabetic visit. He has type 2 diabetes mellitus. Onset time: since 2008. Disease course: last A1C 6.9 feb 2020. There are no hypoglycemic associated symptoms. Pertinent negatives for hypoglycemia include no dizziness or headaches. Pertinent negatives for diabetes include no blurred vision, no chest pain and no fatigue. There are no hypoglycemic complications. Symptoms are stable. Pertinent negatives for diabetic complications include no heart disease, peripheral neuropathy or retinopathy. Risk factors for coronary artery disease include diabetes mellitus, dyslipidemia, male sex, hypertension, sedentary lifestyle and obesity. Current diabetic treatment includes diet and oral agent (dual therapy). He is compliant with treatment most of the time. His weight is increasing steadily. Diabetic current diet: trying to adjust serving sizes. He rarely participates in exercise. An ACE inhibitor/angiotensin II receptor blocker is being taken. Eye exam is current.   Hypertension   This is a chronic problem. The current episode started more than 1 year ago. The problem has been waxing and waning (bp in mornings is still above goal; would like to adjust BP meds) since onset. The problem is uncontrolled. Pertinent negatives include no blurred vision, chest pain, headaches, palpitations, peripheral edema or shortness of breath. Agents associated with hypertension include steroids. Current antihypertension treatment includes ACE inhibitors. The current treatment provides mild improvement. Compliance problems include exercise and diet.  There is no history of angina, CAD/MI or retinopathy.    Hyperlipidemia   This is a chronic problem. The current episode started more than 1 year ago. Condition status: pt reports lipids were better controlled when he took vascepa. Exacerbating diseases include diabetes and obesity. Pertinent negatives include no chest pain, myalgias or shortness of breath. Current antihyperlipidemic treatment includes statins, fibric acid derivatives and diet change. The current treatment provides mild improvement of lipids. Compliance problems include adherence to diet and adherence to exercise.    Erectile Dysfunction   This is a chronic problem. The current episode started more than 1 year ago. The problem is unchanged. The symptoms are aggravated by medications (chronic diseases diabetes htn). Past treatments include sildenafil. The treatment provided moderate relief. He has had no adverse reactions caused by medications. Risk factors include diabetes mellitus and hypertension.       Review of Systems   Constitutional: Negative for appetite change, fatigue, fever and unexpected weight gain.   Eyes: Negative for blurred vision and visual disturbance.   Respiratory: Negative for cough and shortness of breath.    Cardiovascular: Negative for chest pain, palpitations and leg swelling.   Gastrointestinal: Negative for abdominal pain.   Genitourinary: Positive for erectile dysfunction. Negative for difficulty urinating.   Musculoskeletal: Negative for myalgias.   Skin: Negative for rash and skin lesions.   Neurological: Negative for dizziness, syncope, light-headedness and headache.         [facility]  The following portions of the patient's history were reviewed and updated as appropriate: allergies, current medications, past family history, past medical history, past social history, past surgical history and problem list.     Past Medical History:   Diagnosis Date   • Diabetes mellitus (CMS/HCC)    • ED (erectile dysfunction)    • Hyperlipidemia    • Hypertension    • Rectal spasm    •  "Sleep apnea       Past Surgical History:   Procedure Laterality Date   • COLONOSCOPY     • HERNIA REPAIR     • SHOULDER ACROMIOCLAVICULAR JOINT REPAIR  2016      No Known Allergies   Social History     Socioeconomic History   • Marital status:    Tobacco Use   • Smoking status: Never Smoker   • Smokeless tobacco: Never Used   Substance and Sexual Activity   • Alcohol use: Never     Frequency: Never   • Drug use: Never   • Sexual activity: Yes     Partners: Female     Family History   Adopted: Yes   Family history unknown: Yes            Current Outpatient Medications:   •  atorvastatin (LIPITOR) 20 MG tablet, Take 1 tablet by mouth Every Night., Disp: 90 tablet, Rfl: 0  •  glucose blood (ACCU-CHEK LAVELLE PLUS) test strip, Use as instructed; check blood glucose 3 times per day, Disp: 100 each, Rfl: 12  •  glyburide (DIAbeta) 1.25 MG tablet, Take 1 tablet by mouth 2 (Two) Times a Day With Meals., Disp: 180 tablet, Rfl: 0  •  lisinopril (PRINIVIL,ZESTRIL) 20 MG tablet, Take 2 tab by mouth in mornings and 1 tab at night; daily. Goal BP <130/80, Disp: 270 tablet, Rfl: 0  •  metFORMIN ER (GLUCOPHAGE-XR) 500 MG 24 hr tablet, Take 4 tablets by mouth Daily With Breakfast., Disp: 360 tablet, Rfl: 0  •  sildenafil (REVATIO) 20 MG tablet, Take 5 tablets by mouth Daily As Needed (ED)., Disp: 90 tablet, Rfl: 2  •  Testosterone 200 MG pellet, by Implant route., Disp: , Rfl:   •  ACCU-CHEK SOFTCLIX LANCETS lancets, Use as instructed, Disp: 100 each, Rfl: 12  •  albuterol sulfate  (90 Base) MCG/ACT inhaler, Inhale 2 puffs Every 4 (Four) Hours As Needed (anal spasms)., Disp: 1 inhaler, Rfl: 11  Fenofibrate 160mg daily    VITALS:  /84   Pulse 75   Temp 98 °F (36.7 °C)   Resp 16   Ht 172.7 cm (67.99\")   Wt (!) 138 kg (304 lb 6.4 oz)   SpO2 94%   BMI 46.29 kg/m²     Physical Exam   Constitutional: He is oriented to person, place, and time. He appears well-developed and well-nourished. No distress. He is " morbidly obese.  HENT:   Head: Normocephalic.   Mouth/Throat: Oropharynx is clear and moist.   Eyes: Pupils are equal, round, and reactive to light. Conjunctivae and EOM are normal.   Neck: Normal range of motion.   Cardiovascular: Normal rate, regular rhythm and normal heart sounds.   No edema   Pulmonary/Chest: Effort normal and breath sounds normal. No respiratory distress.   Neurological: He is alert and oriented to person, place, and time.   Skin: Skin is warm and dry. Capillary refill takes less than 2 seconds. No rash noted.   Psychiatric: He has a normal mood and affect. His behavior is normal.   Vitals reviewed.      LABS  Results for orders placed or performed in visit on 09/05/20   POC Glycosylated Hemoglobin (Hb A1C)   Result Value Ref Range    Hemoglobin A1C 8.2 %         ASSESSMENT/PLAN  Harpreet was seen today for diabetes, hypertension, hyperlipidemia and erectile dysfunction.    Diagnoses and all orders for this visit:    Type 2 diabetes mellitus without complication, without long-term current use of insulin (CMS/[facility])  Comments:  Avoid sugary foods/drinks, limit carbs. Goal A1c<6.5%.   add ozempic, cont glyburide and metformin  Instructed on administration of ozempic. Provided with sample and sent rx to pharmacy so pt can assess affordability.  Changed metformin to non XR due to recent recall. Advised pt.  Orders:  -     glyburide (DIAbeta) 1.25 MG tablet; Take 1 tablet by mouth 2 (Two) Times a Day With Meals.  -     POC Glycosylated Hemoglobin (Hb A1C)  -     metFORMIN (GLUCOPHAGE) 500 MG tablet; Take 2 tablets by mouth 2 (Two) Times a Day With Meals.  -     lisinopril (PRINIVIL,ZESTRIL) 20 MG tablet; Take 1 tablet by mouth 2 (two) times a day.  -     Semaglutide,0.25 or 0.5MG/DOS, (Ozempic, 0.25 or 0.5 MG/DOSE,) 2 MG/1.5ML solution pen-injector; Inject 0.25 mg under the skin into the appropriate area as directed 1 (One) Time Per Week.    Mixed hyperlipidemia  Comments:  check fasting lipids on  current regimen statin and tricor. Pt wants to try to see if will possibly qualify for vascepa. His last lipid panel was improved but had been taking statin and the vascepa sample so thinks was not reflective of just statin and finofibrate.  I have sent him a refill of current regimen statin and fenofibrate. Sent rx vascepa to complete PA. Pt advised do not take both the finofibrate and vascepa; ok to cont statin with either. Voiced understanding.  Avoid fried, fatty foods.  Orders:  -     atorvastatin (LIPITOR) 20 MG tablet; Take 1 tablet by mouth Every Night.  -     icosapent ethyl (Vascepa) 1 g capsule capsule; Take 2 g by mouth Daily.  -     fenofibrate 160 MG tablet; Take 1 tablet by mouth Daily.  -     Lipid Panel; Future  -     Comprehensive Metabolic Panel; Future    Essential hypertension  Comments:  Uncontrolled. added hctz; continue lisinopril 40mg AM and 20mg pm. DASH low sodium diet; need exercise. Goal BP< 130/80  Orders:  -     lisinopril-hydrochlorothiazide (PRINZIDE,ZESTORETIC) 20-25 MG per tablet; Take 1 tablet by mouth Daily.  -     lisinopril (PRINIVIL,ZESTRIL) 20 MG tablet; Take 1 tablet by mouth 2 (two) times a day.    Other male erectile dysfunction  Comments:  Educated pt on red flags of headache, chest pain or SOB. If erection lasts >4 hrs he needs to go to ED.   Orders:  -     sildenafil (REVATIO) 20 MG tablet; Take 5 tablets by mouth Daily As Needed (ED).    Pt requested to get labs thru [facility]; provided him with copy of lab orders.  Pt declined pnvx 23.  Pt declines follow up at 3 months with  occupation. He is agreeable to check BP and blood sugar at home. If not controlled on current regimen to contact me for earlier appt.    I discussed the patients findings and my recommendations with patient.  Patient was encouraged to keep me informed of any acute changes, lack of improvement, or any new concerning symptoms.  Patient voiced understanding of all instructions and denied  further questions.      FOLLOW-UP  Return in about 6 months (around 3/5/2021), or if symptoms worsen or fail to improve.  Recommended 3 month appt but pt declined.  Electronically signed by:    PHILIPPE Escalante  09/05/2020    EMR Dragon/Transcription Disclaimer:  Much of this encounter note is an electronic transcription/translation of spoken language to printed text.  The electronic translation of spoken language may permit erroneous, or at times, nonsensical words or phrases to be inadvertently transcribed.  Although I have reviewed the note for such errors, some may still exist

## 2020-09-08 RX ORDER — ALBUTEROL SULFATE 90 UG/1
2 AEROSOL, METERED RESPIRATORY (INHALATION) EVERY 4 HOURS PRN
Qty: 18 G | Refills: 11 | Status: SHIPPED | OUTPATIENT
Start: 2020-09-08 | End: 2021-04-08 | Stop reason: SDUPTHER

## 2020-09-15 ENCOUNTER — TELEPHONE (OUTPATIENT)
Dept: INTERNAL MEDICINE | Facility: CLINIC | Age: 55
End: 2020-09-15

## 2020-09-15 RX ORDER — PEN NEEDLE, DIABETIC 29 G X1/2"
1 NEEDLE, DISPOSABLE MISCELLANEOUS DAILY
Qty: 30 EACH | Refills: 1 | Status: SHIPPED | OUTPATIENT
Start: 2020-09-15 | End: 2021-03-06

## 2020-09-15 NOTE — TELEPHONE ENCOUNTER
----- Message from Harpreet Calvillo sent at 9/15/2020  3:29 PM EDT -----  Regarding: Prescription Question  Contact: [phone]  Since I am taking the 25 mg dose of the ozzempic and not 5 mg I will need a few extra needles for the pens I have.  The pharmacist said she would just need a prescription for that.  Thanks

## 2020-09-17 DIAGNOSIS — I10 ESSENTIAL HYPERTENSION: Primary | ICD-10-CM

## 2020-09-17 DIAGNOSIS — E11.9 TYPE 2 DIABETES MELLITUS WITHOUT COMPLICATION, WITHOUT LONG-TERM CURRENT USE OF INSULIN (HCC): ICD-10-CM

## 2020-09-17 DIAGNOSIS — I10 ESSENTIAL HYPERTENSION: ICD-10-CM

## 2020-09-17 RX ORDER — TERAZOSIN 2 MG/1
2 CAPSULE ORAL NIGHTLY
Qty: 30 CAPSULE | Refills: 2 | Status: SHIPPED | OUTPATIENT
Start: 2020-09-17 | End: 2020-10-02 | Stop reason: SDUPTHER

## 2020-09-17 RX ORDER — LISINOPRIL 20 MG/1
TABLET ORAL
Qty: 360 TABLET | Refills: 0 | Status: SHIPPED | OUTPATIENT
Start: 2020-09-17 | End: 2020-11-30 | Stop reason: SDUPTHER

## 2020-10-02 DIAGNOSIS — I10 ESSENTIAL HYPERTENSION: ICD-10-CM

## 2020-10-02 NOTE — TELEPHONE ENCOUNTER
Caller: Harpreet Calvillo    Relationship: Self    Best call back number:    Medication needed: [phone]   Requested Prescriptions     Pending Prescriptions Disp Refills   • terazosin (HYTRIN) 2 MG capsule 30 capsule 2     Sig: Take 1 capsule by mouth Every Night.     What details did the patient provide when requesting the medication: PATIENT HAS 1 LEFT FOR TONIGHT   HE STARTED DOUBLING UP ON THE MEDICATION TAKING 1 IN THE MORNING AND 1 AT NIGHT AND HE HAS RUN OUT     Does the patient have less than a 3 day supply:  [x] Yes  [] No    What is the patient's preferred pharmacy: [facility] [address] [facility] [phone] [facility] [phone] [facility]

## 2020-10-05 RX ORDER — TERAZOSIN 2 MG/1
2 CAPSULE ORAL 2 TIMES DAILY
Qty: 60 CAPSULE | Refills: 2 | Status: SHIPPED | OUTPATIENT
Start: 2020-10-05 | End: 2021-01-01 | Stop reason: SDUPTHER

## 2020-11-27 DIAGNOSIS — E11.9 TYPE 2 DIABETES MELLITUS WITHOUT COMPLICATION, WITHOUT LONG-TERM CURRENT USE OF INSULIN (HCC): ICD-10-CM

## 2020-11-27 DIAGNOSIS — I10 ESSENTIAL HYPERTENSION: ICD-10-CM

## 2020-11-30 RX ORDER — LISINOPRIL 20 MG/1
TABLET ORAL
Qty: 270 TABLET | Refills: 0 | Status: SHIPPED | OUTPATIENT
Start: 2020-11-30 | End: 2021-03-06 | Stop reason: SDUPTHER

## 2020-11-30 NOTE — TELEPHONE ENCOUNTER
Last Office Visit: 9/5/20  Next Office Visit:3/6/21    Labs completed in past 6 months? yes  Labs completed in past year? yes    Last Refill Date:9/17/20  Quantity:360  Refills:0    Pharmacy:

## 2021-01-01 DIAGNOSIS — I10 ESSENTIAL HYPERTENSION: ICD-10-CM

## 2021-01-01 RX ORDER — LISINOPRIL AND HYDROCHLOROTHIAZIDE 25; 20 MG/1; MG/1
1 TABLET ORAL DAILY
Qty: 90 TABLET | Refills: 0 | Status: SHIPPED | OUTPATIENT
Start: 2021-01-01 | End: 2021-03-06 | Stop reason: SDUPTHER

## 2021-01-01 RX ORDER — TERAZOSIN 2 MG/1
2 CAPSULE ORAL 2 TIMES DAILY
Qty: 180 CAPSULE | Refills: 0 | Status: SHIPPED | OUTPATIENT
Start: 2021-01-01 | End: 2021-03-06 | Stop reason: SDUPTHER

## 2021-03-06 ENCOUNTER — OFFICE VISIT (OUTPATIENT)
Dept: INTERNAL MEDICINE | Facility: CLINIC | Age: 56
End: 2021-03-06

## 2021-03-06 VITALS
HEART RATE: 88 BPM | WEIGHT: 311.6 LBS | OXYGEN SATURATION: 97 % | SYSTOLIC BLOOD PRESSURE: 136 MMHG | BODY MASS INDEX: 47.39 KG/M2 | DIASTOLIC BLOOD PRESSURE: 70 MMHG | TEMPERATURE: 97.9 F

## 2021-03-06 DIAGNOSIS — E11.9 TYPE 2 DIABETES MELLITUS WITHOUT COMPLICATION, WITHOUT LONG-TERM CURRENT USE OF INSULIN (HCC): Primary | ICD-10-CM

## 2021-03-06 DIAGNOSIS — Z12.5 SCREENING FOR MALIGNANT NEOPLASM OF PROSTATE: ICD-10-CM

## 2021-03-06 DIAGNOSIS — I10 ESSENTIAL HYPERTENSION: ICD-10-CM

## 2021-03-06 DIAGNOSIS — E78.2 MIXED HYPERLIPIDEMIA: ICD-10-CM

## 2021-03-06 LAB — HBA1C MFR BLD: 7.4 %

## 2021-03-06 PROCEDURE — 83036 HEMOGLOBIN GLYCOSYLATED A1C: CPT | Performed by: NURSE PRACTITIONER

## 2021-03-06 PROCEDURE — 99214 OFFICE O/P EST MOD 30 MIN: CPT | Performed by: NURSE PRACTITIONER

## 2021-03-06 RX ORDER — ICOSAPENT ETHYL 1000 MG/1
2 CAPSULE ORAL DAILY
Qty: 180 CAPSULE | Refills: 1 | Status: SHIPPED | OUTPATIENT
Start: 2021-03-06 | End: 2021-09-04 | Stop reason: SDUPTHER

## 2021-03-06 RX ORDER — FENOFIBRATE 160 MG/1
160 TABLET ORAL DAILY
Qty: 90 TABLET | Refills: 1 | Status: SHIPPED | OUTPATIENT
Start: 2021-03-06 | End: 2021-09-04 | Stop reason: SDUPTHER

## 2021-03-06 RX ORDER — METFORMIN HYDROCHLORIDE 500 MG/1
500 TABLET, EXTENDED RELEASE ORAL
Qty: 360 TABLET | Refills: 1 | Status: SHIPPED | OUTPATIENT
Start: 2021-03-06 | End: 2021-09-04 | Stop reason: SDUPTHER

## 2021-03-06 RX ORDER — SEMAGLUTIDE 1.34 MG/ML
0.25 INJECTION, SOLUTION SUBCUTANEOUS WEEKLY
Qty: 4 PEN | Refills: 1 | Status: SHIPPED | OUTPATIENT
Start: 2021-03-06 | End: 2021-04-20 | Stop reason: SDUPTHER

## 2021-03-06 RX ORDER — LISINOPRIL 20 MG/1
TABLET ORAL
Qty: 270 TABLET | Refills: 0 | Status: SHIPPED | OUTPATIENT
Start: 2021-03-06 | End: 2021-09-04 | Stop reason: SDUPTHER

## 2021-03-06 RX ORDER — LISINOPRIL AND HYDROCHLOROTHIAZIDE 25; 20 MG/1; MG/1
1 TABLET ORAL DAILY
Qty: 90 TABLET | Refills: 3 | Status: CANCELLED | OUTPATIENT
Start: 2021-03-06

## 2021-03-06 RX ORDER — ICOSAPENT ETHYL 1000 MG/1
2 CAPSULE ORAL DAILY
Qty: 180 CAPSULE | Refills: 3 | Status: CANCELLED | OUTPATIENT
Start: 2021-03-06

## 2021-03-06 RX ORDER — GLYBURIDE 1.25 MG/1
2.5 TABLET ORAL 2 TIMES DAILY WITH MEALS
Qty: 360 TABLET | Refills: 1 | Status: SHIPPED | OUTPATIENT
Start: 2021-03-06 | End: 2021-09-04 | Stop reason: SDUPTHER

## 2021-03-06 RX ORDER — TERAZOSIN 2 MG/1
2 CAPSULE ORAL 2 TIMES DAILY
Qty: 180 CAPSULE | Refills: 3 | Status: CANCELLED | OUTPATIENT
Start: 2021-03-06

## 2021-03-06 RX ORDER — LISINOPRIL 20 MG/1
TABLET ORAL
Qty: 270 TABLET | Refills: 3 | Status: CANCELLED | OUTPATIENT
Start: 2021-03-06

## 2021-03-06 RX ORDER — FENOFIBRATE 160 MG/1
160 TABLET ORAL DAILY
Qty: 90 TABLET | Refills: 3 | Status: CANCELLED | OUTPATIENT
Start: 2021-03-06

## 2021-03-06 RX ORDER — TERAZOSIN 2 MG/1
2 CAPSULE ORAL 2 TIMES DAILY
Qty: 180 CAPSULE | Refills: 1 | Status: SHIPPED | OUTPATIENT
Start: 2021-03-06 | End: 2021-09-04 | Stop reason: SDUPTHER

## 2021-03-06 RX ORDER — LISINOPRIL AND HYDROCHLOROTHIAZIDE 25; 20 MG/1; MG/1
1 TABLET ORAL DAILY
Qty: 90 TABLET | Refills: 1 | Status: SHIPPED | OUTPATIENT
Start: 2021-03-06 | End: 2021-09-04 | Stop reason: SDUPTHER

## 2021-03-06 RX ORDER — ATORVASTATIN CALCIUM 20 MG/1
20 TABLET, FILM COATED ORAL NIGHTLY
Qty: 90 TABLET | Refills: 3 | Status: SHIPPED | OUTPATIENT
Start: 2021-03-06 | End: 2022-01-18

## 2021-03-06 NOTE — PROGRESS NOTES
Chief Complaint   Patient presents with   • Follow-up   • Diabetes   • Hyperlipidemia   • Hypertension       History of Present Illness    55 y.o.male presents for diabetes, htn, HLD follow up.    Diabetes  He presents for his follow-up diabetic visit. He has type 2 diabetes mellitus. His disease course has been stable. There are no hypoglycemic associated symptoms. Pertinent negatives for hypoglycemia include no headaches. There are no diabetic associated symptoms. Pertinent negatives for diabetes include no chest pain, no fatigue, no foot paresthesias, no foot ulcerations and no visual change. There are no hypoglycemic complications. Symptoms are stable. Current diabetic treatment includes diet and oral agent (triple therapy). He is compliant with treatment all of the time. He is following a diabetic diet. He participates in exercise intermittently. An ACE inhibitor/angiotensin II receptor blocker is being taken.   Hypertension  This is a chronic problem. The current episode started more than 1 year ago. The problem has been gradually improving since onset. The problem is controlled. Pertinent negatives include no chest pain, headaches, malaise/fatigue, peripheral edema or shortness of breath. Current antihypertension treatment includes ACE inhibitors and diuretics. The current treatment provides moderate improvement. Compliance problems include diet and exercise.    Hyperlipidemia  This is a chronic problem. The current episode started more than 1 year ago. The problem is controlled. Exacerbating diseases include diabetes and obesity. Pertinent negatives include no chest pain, focal weakness, myalgias or shortness of breath. Current antihyperlipidemic treatment includes statins. Compliance problems include adherence to diet and adherence to exercise.        ROS: any positive referenced above.   Other ROS negative.      [facility]  The following portions of the patient's history were reviewed and updated as  appropriate: allergies, current medications, past family history, past medical history, past social history, past surgical history and problem list.     Past Medical History:   Diagnosis Date   • Diabetes mellitus (CMS/HCC)    • ED (erectile dysfunction)    • Hyperlipidemia    • Hypertension    • Rectal spasm    • Sleep apnea       No Known Allergies   Social History     Tobacco Use   • Smoking status: Never Smoker   • Smokeless tobacco: Never Used   Substance Use Topics   • Alcohol use: Never   • Drug use: Never         Current Outpatient Medications:   •  albuterol sulfate  (90 Base) MCG/ACT inhaler, Inhale 2 puffs Every 4 (Four) Hours As Needed (anal spasms)., Disp: 18 g, Rfl: 11  •  atorvastatin (LIPITOR) 20 MG tablet, Take 1 tablet by mouth Every Night., Disp: 90 tablet, Rfl: 3  •  fenofibrate 160 MG tablet, Take 1 tablet by mouth Daily., Disp: 90 tablet, Rfl: 1  •  glucose blood (ACCU-CHEK LAVELLE PLUS) test strip, Use as instructed; check blood glucose 3 times per day, Disp: 100 each, Rfl: 12  •  glyburide (DIAbeta) 1.25 MG tablet, Take 1 tablet by mouth 2 (Two) Times a Day With Meals., Disp: 180 tablet, Rfl: 1  •  icosapent ethyl (Vascepa) 1 g capsule capsule, Take 2 g by mouth Daily., Disp: 180 capsule, Rfl: 1  •  lisinopril (PRINIVIL,ZESTRIL) 20 MG tablet, TAKE ONE TABLET BY MOUTH TWICE A DAY, Disp: 270 tablet, Rfl: 0  •  lisinopril-hydrochlorothiazide (PRINZIDE,ZESTORETIC) 20-25 MG per tablet, Take 1 tablet by mouth Daily., Disp: 90 tablet, Rfl: 0  •  metFORMIN (GLUCOPHAGE) 500 MG tablet, Take 2 tablets by mouth 2 (Two) Times a Day With Meals., Disp: 360 tablet, Rfl: 1  •  Semaglutide,0.25 or 0.5MG/DOS, (Ozempic, 0.25 or 0.5 MG/DOSE,) 2 MG/1.5ML solution pen-injector, Inject 0.25 mg under the skin into the appropriate area as directed 1 (One) Time Per Week., Disp: 4 pen, Rfl: 1  •  sildenafil (REVATIO) 20 MG tablet, Take 5 tablets by mouth Daily As Needed (ED)., Disp: 90 tablet, Rfl: 2  •  terazosin  (HYTRIN) 2 MG capsule, Take 1 capsule by mouth 2 (two) times a day., Disp: 180 capsule, Rfl: 0  •  Testosterone 200 MG pellet, by Implant route., Disp: , Rfl:     VITALS:  /70   Pulse 88   Temp 97.9 °F (36.6 °C)   Wt (!) 141 kg (311 lb 9.6 oz)   SpO2 97%   BMI 47.39 kg/m²     Physical Exam  Vitals reviewed.   Constitutional:       General: He is not in acute distress.     Appearance: He is well-developed. He is obese.   HENT:      Head: Normocephalic.      Mouth/Throat:      Mouth: Mucous membranes are moist.   Eyes:      General: Lids are normal.      Extraocular Movements: Extraocular movements intact.      Pupils: Pupils are equal, round, and reactive to light.   Neck:      Vascular: No JVD.   Cardiovascular:      Rate and Rhythm: Normal rate and regular rhythm.      Heart sounds: Normal heart sounds.      Comments: No edema  Pulmonary:      Effort: Pulmonary effort is normal. No respiratory distress.      Breath sounds: Normal breath sounds.   Abdominal:      General: There is no abdominal bruit.      Palpations: There is no hepatomegaly or splenomegaly.   Musculoskeletal:         General: Normal range of motion.      Cervical back: Normal range of motion.      Comments: All major joints with normal ROM   Lymphadenopathy:      Head:      Right side of head: No submental, submandibular or tonsillar adenopathy.      Left side of head: No submental, submandibular or tonsillar adenopathy.   Skin:     General: Skin is warm and dry.   Neurological:      General: No focal deficit present.      Mental Status: He is alert and oriented to person, place, and time.      Cranial Nerves: No cranial nerve deficit.      Coordination: Coordination normal.      Gait: Gait normal.   Psychiatric:         Mood and Affect: Mood normal.         Speech: Speech normal.         Behavior: Behavior normal.         Result Review :     Most Recent A1C    HGBA1C Most Recent 3/6/21   Hemoglobin A1C 7.4                Assessment and  Plan    Diagnoses and all orders for this visit:    1. Type 2 diabetes mellitus without complication, without long-term current use of insulin (CMS/[facility]) (Primary)  Comments:  Avoid sugary foods/drinks, limit carbs. Goal <6.5%. cont ozempic, glyburide and metformin  Orders:  -     POC Glycosylated Hemoglobin (Hb A1C)  -     glyburide (DIAbeta) 1.25 MG tablet; Take 2 tablets by mouth 2 (Two) Times a Day With Meals.  Dispense: 360 tablet; Refill: 1  -     Semaglutide,0.25 or 0.5MG/DOS, (Ozempic, 0.25 or 0.5 MG/DOSE,) 2 MG/1.5ML solution pen-injector; Inject 0.25 mg under the skin into the appropriate area as directed 1 (One) Time Per Week.  Dispense: 4 pen; Refill: 1  -     metFORMIN ER (Glucophage XR) 500 MG 24 hr tablet; Take 1 tablet by mouth Daily With Breakfast & Dinner.  Dispense: 360 tablet; Refill: 1  -     lisinopril (PRINIVIL,ZESTRIL) 20 MG tablet; TAKE ONE TABLET BY MOUTH TWICE A DAY  Dispense: 270 tablet; Refill: 0    2. Mixed hyperlipidemia  Comments:  check fasting lipids on current regimen statin and tricor. Avoid fried, fatty foods.  Orders:  -     Lipid Panel; Future  -     Comprehensive Metabolic Panel; Future  -     atorvastatin (LIPITOR) 20 MG tablet; Take 1 tablet by mouth Every Night.  Dispense: 90 tablet; Refill: 3  -     fenofibrate 160 MG tablet; Take 1 tablet by mouth Daily.  Dispense: 90 tablet; Refill: 1  -     icosapent ethyl (Vascepa) 1 g capsule capsule; Take 2 g by mouth Daily.  Dispense: 180 capsule; Refill: 1    3. Essential hypertension  Comments:   Goal BP< 130/80; cont low sodium diet.  Orders:  -     lisinopril (PRINIVIL,ZESTRIL) 20 MG tablet; TAKE ONE TABLET BY MOUTH TWICE A DAY  Dispense: 270 tablet; Refill: 0  -     lisinopril-hydrochlorothiazide (PRINZIDE,ZESTORETIC) 20-25 MG per tablet; Take 1 tablet by mouth Daily.  Dispense: 90 tablet; Refill: 1  -     terazosin (HYTRIN) 2 MG capsule; Take 1 capsule by mouth 2 (two) times a day.  Dispense: 180 capsule; Refill: 1    4.  Screening for malignant neoplasm of prostate  -     PSA Screen; Future          Follow Up   Return in about 6 months (around 9/6/2021), or if symptoms worsen or fail to improve.      I discussed the patients findings and my recommendations with patient.  Patient was encouraged to keep me informed of any acute changes, lack of improvement, or any new concerning symptoms.  Patient voiced understanding of all instructions and denied further questions.    Electronically signed by:    PHILIPPE Escalante  03/06/2021    EMR Dragon/Transcription Disclaimer:  Much of this encounter note is an electronic transcription/translation of spoken language to printed text.  The electronic translation of spoken language may permit erroneous, or at times, nonsensical words or phrases to be inadvertently transcribed.  Although I have reviewed the note for such errors, some may still exist

## 2021-03-13 LAB
ALBUMIN SERPL-MCNC: 4.2 G/DL (ref 3.8–4.9)
ALBUMIN/GLOB SERPL: 1.6 {RATIO} (ref 1.2–2.2)
ALP SERPL-CCNC: 55 IU/L (ref 39–117)
ALT SERPL-CCNC: 45 IU/L (ref 0–44)
AMBIG ABBREV CMP14 DEFAULT: NORMAL
AMBIG ABBREV LP DEFAULT: NORMAL
AST SERPL-CCNC: 28 IU/L (ref 0–40)
BILIRUB SERPL-MCNC: 0.2 MG/DL (ref 0–1.2)
BUN SERPL-MCNC: 15 MG/DL (ref 6–24)
BUN/CREAT SERPL: 13 (ref 9–20)
CALCIUM SERPL-MCNC: 9.8 MG/DL (ref 8.7–10.2)
CHLORIDE SERPL-SCNC: 100 MMOL/L (ref 96–106)
CHOLEST SERPL-MCNC: 163 MG/DL (ref 100–199)
CO2 SERPL-SCNC: 24 MMOL/L (ref 20–29)
CREAT SERPL-MCNC: 1.19 MG/DL (ref 0.76–1.27)
GLOBULIN SER CALC-MCNC: 2.6 G/DL (ref 1.5–4.5)
GLUCOSE SERPL-MCNC: 185 MG/DL (ref 65–99)
HDLC SERPL-MCNC: 36 MG/DL
LDLC SERPL CALC-MCNC: 78 MG/DL (ref 0–99)
POTASSIUM SERPL-SCNC: 4.3 MMOL/L (ref 3.5–5.2)
PROT SERPL-MCNC: 6.8 G/DL (ref 6–8.5)
PSA SERPL-MCNC: 1.1 NG/ML (ref 0–4)
SODIUM SERPL-SCNC: 139 MMOL/L (ref 134–144)
TRIGL SERPL-MCNC: 300 MG/DL (ref 0–149)
VLDLC SERPL CALC-MCNC: 49 MG/DL (ref 5–40)

## 2021-03-16 ENCOUNTER — PRIOR AUTHORIZATION (OUTPATIENT)
Dept: INTERNAL MEDICINE | Facility: CLINIC | Age: 56
End: 2021-03-16

## 2021-03-16 NOTE — PROGRESS NOTES
Lab review: electrolytes liver and kidney function ok. Total cholesterol ok. LDL bad chol ok. Triglycerides elevated, which if you were not fasting before test can make this elevated. Cut back on your fatty fried foods; increase fresh vegetables in diet. Normal prostate screening lab.

## 2021-04-08 DIAGNOSIS — N52.8 OTHER MALE ERECTILE DYSFUNCTION: ICD-10-CM

## 2021-04-09 RX ORDER — ALBUTEROL SULFATE 90 UG/1
2 AEROSOL, METERED RESPIRATORY (INHALATION) EVERY 4 HOURS PRN
Qty: 18 G | Refills: 11 | Status: SHIPPED | OUTPATIENT
Start: 2021-04-09 | End: 2022-09-10 | Stop reason: SDUPTHER

## 2021-04-09 RX ORDER — SILDENAFIL CITRATE 20 MG/1
100 TABLET ORAL DAILY PRN
Qty: 90 TABLET | Refills: 2 | Status: SHIPPED | OUTPATIENT
Start: 2021-04-09 | End: 2022-12-20 | Stop reason: SDUPTHER

## 2021-04-20 DIAGNOSIS — E11.9 TYPE 2 DIABETES MELLITUS WITHOUT COMPLICATION, WITHOUT LONG-TERM CURRENT USE OF INSULIN (HCC): ICD-10-CM

## 2021-04-20 RX ORDER — SEMAGLUTIDE 1.34 MG/ML
0.25 INJECTION, SOLUTION SUBCUTANEOUS WEEKLY
Qty: 2 PEN | Refills: 1 | Status: SHIPPED | OUTPATIENT
Start: 2021-04-20 | End: 2021-09-04 | Stop reason: SDUPTHER

## 2021-05-03 ENCOUNTER — TELEPHONE (OUTPATIENT)
Dept: INTERNAL MEDICINE | Facility: CLINIC | Age: 56
End: 2021-05-03

## 2021-05-03 NOTE — TELEPHONE ENCOUNTER
Refill request received from [facility] via mail order. Spoke with pt about previous message regarding refills, pt will need to contact pharmacy for any other refills.

## 2021-07-05 DIAGNOSIS — E11.9 TYPE 2 DIABETES MELLITUS WITHOUT COMPLICATION, WITHOUT LONG-TERM CURRENT USE OF INSULIN (HCC): ICD-10-CM

## 2021-07-05 DIAGNOSIS — I10 ESSENTIAL HYPERTENSION: ICD-10-CM

## 2021-07-06 RX ORDER — GLYBURIDE 1.25 MG/1
TABLET ORAL
Qty: 339 TABLET | Refills: 0 | OUTPATIENT
Start: 2021-07-06

## 2021-07-06 RX ORDER — LISINOPRIL 20 MG/1
TABLET ORAL
Qty: 180 TABLET | Refills: 0 | OUTPATIENT
Start: 2021-07-06

## 2021-09-04 ENCOUNTER — OFFICE VISIT (OUTPATIENT)
Dept: INTERNAL MEDICINE | Facility: CLINIC | Age: 56
End: 2021-09-04

## 2021-09-04 VITALS
OXYGEN SATURATION: 96 % | SYSTOLIC BLOOD PRESSURE: 138 MMHG | BODY MASS INDEX: 46.72 KG/M2 | DIASTOLIC BLOOD PRESSURE: 82 MMHG | WEIGHT: 307.2 LBS | HEART RATE: 91 BPM | TEMPERATURE: 96.4 F

## 2021-09-04 DIAGNOSIS — E11.9 TYPE 2 DIABETES MELLITUS WITHOUT COMPLICATION, WITHOUT LONG-TERM CURRENT USE OF INSULIN (HCC): ICD-10-CM

## 2021-09-04 DIAGNOSIS — E78.2 MIXED HYPERLIPIDEMIA: Primary | ICD-10-CM

## 2021-09-04 DIAGNOSIS — H60.312 ACUTE DIFFUSE OTITIS EXTERNA OF LEFT EAR: ICD-10-CM

## 2021-09-04 DIAGNOSIS — I10 ESSENTIAL HYPERTENSION: ICD-10-CM

## 2021-09-04 DIAGNOSIS — B35.1 ONYCHOMYCOSIS: ICD-10-CM

## 2021-09-04 LAB
BILIRUB BLD-MCNC: NEGATIVE MG/DL
CLARITY, POC: CLEAR
COLOR UR: YELLOW
GLUCOSE UR STRIP-MCNC: NEGATIVE MG/DL
HBA1C MFR BLD: 8.5 %
KETONES UR QL: NEGATIVE
LEUKOCYTE EST, POC: NEGATIVE
NITRITE UR-MCNC: NEGATIVE MG/ML
PH UR: 5.5 [PH] (ref 5–8)
PROT UR STRIP-MCNC: NEGATIVE MG/DL
RBC # UR STRIP: NEGATIVE /UL
SP GR UR: 1.03 (ref 1–1.03)
UROBILINOGEN UR QL: NORMAL

## 2021-09-04 PROCEDURE — 99214 OFFICE O/P EST MOD 30 MIN: CPT | Performed by: NURSE PRACTITIONER

## 2021-09-04 PROCEDURE — 82570 ASSAY OF URINE CREATININE: CPT | Performed by: NURSE PRACTITIONER

## 2021-09-04 PROCEDURE — 81003 URINALYSIS AUTO W/O SCOPE: CPT | Performed by: NURSE PRACTITIONER

## 2021-09-04 PROCEDURE — 82043 UR ALBUMIN QUANTITATIVE: CPT | Performed by: NURSE PRACTITIONER

## 2021-09-04 RX ORDER — METFORMIN HYDROCHLORIDE 500 MG/1
1000 TABLET, EXTENDED RELEASE ORAL
Qty: 360 TABLET | Refills: 1 | Status: SHIPPED | OUTPATIENT
Start: 2021-09-04 | End: 2022-03-04 | Stop reason: SDUPTHER

## 2021-09-04 RX ORDER — GLYBURIDE 2.5 MG/1
2.5 TABLET ORAL 2 TIMES DAILY WITH MEALS
Qty: 180 TABLET | Refills: 1 | Status: SHIPPED | OUTPATIENT
Start: 2021-09-04 | End: 2022-03-04 | Stop reason: SDUPTHER

## 2021-09-04 RX ORDER — ICOSAPENT ETHYL 1000 MG/1
2 CAPSULE ORAL DAILY
Qty: 180 CAPSULE | Refills: 1 | Status: SHIPPED | OUTPATIENT
Start: 2021-09-04 | End: 2022-03-04 | Stop reason: SDUPTHER

## 2021-09-04 RX ORDER — LISINOPRIL 20 MG/1
TABLET ORAL
Qty: 270 TABLET | Refills: 0 | Status: SHIPPED | OUTPATIENT
Start: 2021-09-04 | End: 2022-01-18

## 2021-09-04 RX ORDER — FENOFIBRATE 160 MG/1
160 TABLET ORAL DAILY
Qty: 90 TABLET | Refills: 1 | Status: SHIPPED | OUTPATIENT
Start: 2021-09-04 | End: 2022-03-04 | Stop reason: SDUPTHER

## 2021-09-04 RX ORDER — GLYBURIDE 1.25 MG/1
2.5 TABLET ORAL 2 TIMES DAILY WITH MEALS
Qty: 360 TABLET | Refills: 1 | Status: CANCELLED | OUTPATIENT
Start: 2021-09-04

## 2021-09-04 RX ORDER — TERAZOSIN 2 MG/1
2 CAPSULE ORAL 2 TIMES DAILY
Qty: 180 CAPSULE | Refills: 1 | Status: SHIPPED | OUTPATIENT
Start: 2021-09-04 | End: 2022-03-04 | Stop reason: SDUPTHER

## 2021-09-04 RX ORDER — LISINOPRIL AND HYDROCHLOROTHIAZIDE 25; 20 MG/1; MG/1
1 TABLET ORAL DAILY
Qty: 90 TABLET | Refills: 1 | Status: SHIPPED | OUTPATIENT
Start: 2021-09-04 | End: 2022-03-04 | Stop reason: SDUPTHER

## 2021-09-04 RX ORDER — SEMAGLUTIDE 1.34 MG/ML
0.5 INJECTION, SOLUTION SUBCUTANEOUS WEEKLY
Qty: 4 PEN | Refills: 1 | Status: SHIPPED | OUTPATIENT
Start: 2021-09-04 | End: 2021-11-05 | Stop reason: SDUPTHER

## 2021-09-04 NOTE — PROGRESS NOTES
Chief Complaint   Patient presents with   • Diabetes     follow up   • Hyperlipidemia       History of Present Illness    55 y.o.male presents for diabetes and hyperlipidemia follow up.  Diabetes type 2 chronic onset years.  On ozempic, metformin, glyburide.  On ace and statin.    Works as a ; cant do insulin.    C/o left ear pain recurrent intermittent weeks.; swells drains asking about ear drops.     EDUARDO; cpap machine has been recalled and was needing new rx for cpap machine. He did get this but expresses concern regarding cont to see sleep med with copays.    Review of Systems   Constitutional: Negative for appetite change, chills, fever and unexpected weight gain.   HENT: Positive for ear discharge and ear pain. Negative for congestion, hearing loss, postnasal drip, rhinorrhea, sinus pressure, sneezing, sore throat and swollen glands.    Eyes: Negative for blurred vision and visual disturbance.   Respiratory: Negative for cough and shortness of breath.    Cardiovascular: Negative for chest pain, palpitations and leg swelling.   Gastrointestinal: Negative for abdominal pain.   Endocrine: Negative for cold intolerance, heat intolerance, polydipsia, polyphagia and polyuria.   Genitourinary: Negative for difficulty urinating.   Skin: Negative for rash and skin lesions.   Neurological: Negative for dizziness and light-headedness.         [facility]  The following portions of the patient's history were reviewed and updated as appropriate: allergies, current medications, past family history, past medical history, past social history, past surgical history and problem list.     Past Medical History:   Diagnosis Date   • Diabetes mellitus (CMS/[facility])    • ED (erectile dysfunction)    • Hyperlipidemia    • Hypertension    • Rectal spasm    • Sleep apnea       No Known Allergies   Social History     Tobacco Use   • Smoking status: Never Smoker   • Smokeless tobacco: Never Used   Vaping Use   • Vaping Use: Never used    Substance Use Topics   • Alcohol use: Never   • Drug use: Never     Past Surgical History:   Procedure Laterality Date   • COLONOSCOPY     • HERNIA REPAIR     • SHOULDER ACROMIOCLAVICULAR JOINT REPAIR  2016      Family History   Adopted: Yes   Family history unknown: Yes           Current Outpatient Medications:   •  albuterol sulfate  (90 Base) MCG/ACT inhaler, Inhale 2 puffs Every 4 (Four) Hours As Needed (anal spasms)., Disp: 18 g, Rfl: 11  •  atorvastatin (LIPITOR) 20 MG tablet, Take 1 tablet by mouth Every Night., Disp: 90 tablet, Rfl: 3  •  fenofibrate 160 MG tablet, Take 1 tablet by mouth Daily., Disp: 90 tablet, Rfl: 1  •  glucose blood (ACCU-CHEK LAVELLE PLUS) test strip, Use as instructed; check blood glucose 3 times per day, Disp: 100 each, Rfl: 12  •  glyburide (DIAbeta) 1.25 MG tablet, Take 2 tablets by mouth 2 (Two) Times a Day With Meals., Disp: 360 tablet, Rfl: 1  •  icosapent ethyl (Vascepa) 1 g capsule capsule, Take 2 g by mouth Daily., Disp: 180 capsule, Rfl: 1  •  lisinopril (PRINIVIL,ZESTRIL) 20 MG tablet, TAKE ONE TABLET BY MOUTH TWICE A DAY, Disp: 270 tablet, Rfl: 0  •  lisinopril-hydrochlorothiazide (PRINZIDE,ZESTORETIC) 20-25 MG per tablet, Take 1 tablet by mouth Daily., Disp: 90 tablet, Rfl: 1  •  metFORMIN ER (Glucophage XR) 500 MG 24 hr tablet, Take 1 tablet by mouth Daily With Breakfast & Dinner., Disp: 360 tablet, Rfl: 1  •  Semaglutide,0.25 or 0.5MG/DOS, (Ozempic, 0.25 or 0.5 MG/DOSE,) 2 MG/1.5ML solution pen-injector, Inject 0.25 mg under the skin into the appropriate area as directed 1 (One) Time Per Week., Disp: 2 pen, Rfl: 1  •  sildenafil (REVATIO) 20 MG tablet, Take 5 tablets by mouth Daily As Needed (ED)., Disp: 90 tablet, Rfl: 2  •  terazosin (HYTRIN) 2 MG capsule, Take 1 capsule by mouth 2 (two) times a day., Disp: 180 capsule, Rfl: 1  •  Testosterone 200 MG pellet, by Implant route., Disp: , Rfl:     VITALS:  /82   Pulse 91   Temp 96.4 °F (35.8 °C)   Wt (!)  139 kg (307 lb 3.2 oz)   SpO2 96%   BMI 46.72 kg/m²     Physical Exam  Vitals reviewed.   Constitutional:       Appearance: He is obese.   HENT:      Head: Normocephalic.      Right Ear: Swelling and tenderness present. Tympanic membrane is not injected, perforated or erythematous.      Left Ear: Swelling and tenderness present. Tympanic membrane is not injected, scarred, perforated or erythematous.      Ears:      Comments: giovanni canal with erythema  Cardiovascular:      Rate and Rhythm: Normal rate.      Pulses:           Dorsalis pedis pulses are 2+ on the right side and 2+ on the left side.        Posterior tibial pulses are 2+ on the right side and 2+ on the left side.      Heart sounds: Normal heart sounds.   Pulmonary:      Effort: Pulmonary effort is normal. No respiratory distress.      Breath sounds: Normal breath sounds.   Musculoskeletal:      Right foot: Normal range of motion. No deformity.      Left foot: Normal range of motion. No deformity.   Feet:      Right foot:      Protective Sensation: 10 sites tested. 10 sites sensed.      Skin integrity: Skin integrity normal.      Toenail Condition: Right toenails are abnormally thick.      Left foot:      Protective Sensation: 10 sites tested. 10 sites sensed.      Skin integrity: Skin integrity normal.      Toenail Condition: Left toenails are abnormally thick.   Neurological:      Mental Status: He is alert and oriented to person, place, and time.   Psychiatric:         Mood and Affect: Mood normal.         Result Review :     Most Recent A1C    HGBA1C Most Recent 9/4/21   Hemoglobin A1C 8.5                Assessment and Plan    Diagnoses and all orders for this visit:    1. Mixed hyperlipidemia (Primary)  Comments:  cont statin and tricor. Avoid fried, fatty foods. need exercise and wt loss.  Orders:  -     fenofibrate 160 MG tablet; Take 1 tablet by mouth Daily.  Dispense: 90 tablet; Refill: 1  -     icosapent ethyl (Vascepa) 1 g capsule capsule; Take  2 g by mouth Daily.  Dispense: 180 capsule; Refill: 1    2. Type 2 diabetes mellitus without complication, without long-term current use of insulin (CMS/[facility])  Comments:  Avoid sugary foods/drinks, limit carbs. Goal <6.5%. cont ozempic, glyburide and metformin; increased ozempic and metformin  Orders:  -     metFORMIN ER (Glucophage XR) 500 MG 24 hr tablet; Take 2 tablets by mouth Daily With Breakfast & Dinner.  Dispense: 360 tablet; Refill: 1  -     lisinopril (PRINIVIL,ZESTRIL) 20 MG tablet; TAKE ONE TABLET BY MOUTH TWICE A DAY  Dispense: 270 tablet; Refill: 0  -     POC Glycosylated Hemoglobin (Hb A1C)  -     glyburide (DIAbeta) 2.5 MG tablet; Take 1 tablet by mouth 2 (Two) Times a Day With Meals.  Dispense: 180 tablet; Refill: 1  -     Semaglutide,0.25 or 0.5MG/DOS, (Ozempic, 0.25 or 0.5 MG/DOSE,) 2 MG/1.5ML solution pen-injector; Inject 0.5 mg under the skin into the appropriate area as directed 1 (One) Time Per Week.  Dispense: 4 pen; Refill: 1  -     Microalbumin / Creatinine Urine Ratio - Urine, Clean Catch; Future  -     Ambulatory Referral for Diabetic Eye Exam-Ophthalmology  -     POCT urinalysis dipstick, automated  -     Microalbumin / Creatinine Urine Ratio - Urine, Clean Catch    3. Essential hypertension  Comments:   Goal BP< 130/80; cont low sodium diet.  Orders:  -     lisinopril (PRINIVIL,ZESTRIL) 20 MG tablet; TAKE ONE TABLET BY MOUTH TWICE A DAY  Dispense: 270 tablet; Refill: 0  -     lisinopril-hydrochlorothiazide (PRINZIDE,ZESTORETIC) 20-25 MG per tablet; Take 1 tablet by mouth Daily.  Dispense: 90 tablet; Refill: 1  -     terazosin (HYTRIN) 2 MG capsule; Take 1 capsule by mouth 2 (two) times a day.  Dispense: 180 capsule; Refill: 1    4. Onychomycosis  -     ciclopirox (PENLAC) 8 % solution; Apply  topically to the appropriate area as directed Every Night.  Dispense: 12 mL; Refill: 3    5. Acute diffuse otitis externa of left ear  -     neomycin-polymyxin-hydrocortisone (CORTISPORIN)  [id number] otic solution; Administer 3 drops into both ears 4 (Four) Times a Day for 7 days.  Dispense: 10 mL; Refill: 1    Explained to pt, I do not do sleep studies in this office. I don't mind to order supplies as long as I have an order to work with from sleep med office with same machine/supplies. If he needs something different on machine supplies need to see sleep med.    I discussed the patients findings and my recommendations with patient.  Patient was encouraged to keep me informed of any acute changes, lack of improvement, or any new concerning symptoms.  Patient voiced understanding of all instructions and denied further questions.      Follow Up   Return in about 3 months (around 12/4/2021), or if symptoms worsen or fail to improve.      Electronically signed by:    PHILIPPE Escalante  09/04/2021

## 2021-09-05 LAB
ALBUMIN UR-MCNC: <1.2 MG/DL
CREAT UR-MCNC: 154.9 MG/DL
MICROALBUMIN/CREAT UR: NORMAL MG/G{CREAT}

## 2021-09-07 ENCOUNTER — PRIOR AUTHORIZATION (OUTPATIENT)
Dept: INTERNAL MEDICINE | Facility: CLINIC | Age: 56
End: 2021-09-07

## 2021-11-05 ENCOUNTER — TELEPHONE (OUTPATIENT)
Dept: INTERNAL MEDICINE | Facility: CLINIC | Age: 56
End: 2021-11-05

## 2021-11-05 ENCOUNTER — PATIENT MESSAGE (OUTPATIENT)
Dept: INTERNAL MEDICINE | Facility: CLINIC | Age: 56
End: 2021-11-05

## 2021-11-05 DIAGNOSIS — E11.9 TYPE 2 DIABETES MELLITUS WITHOUT COMPLICATION, WITHOUT LONG-TERM CURRENT USE OF INSULIN (HCC): ICD-10-CM

## 2021-11-05 RX ORDER — SEMAGLUTIDE 1.34 MG/ML
0.5 INJECTION, SOLUTION SUBCUTANEOUS WEEKLY
Qty: 4 PEN | Refills: 1 | Status: SHIPPED | OUTPATIENT
Start: 2021-11-05 | End: 2022-03-04 | Stop reason: SDUPTHER

## 2021-11-05 NOTE — TELEPHONE ENCOUNTER
PT says 90 days scrip for ozempic has not been called to [facility] mail-in.  Pt upset about cost difference between 30 and 90 day supply.  PT number and pharmacy confirmed.

## 2021-11-05 NOTE — TELEPHONE ENCOUNTER
From: Harpreet Calvillo  To: PHILIPPE Escalante  Sent: 11/5/2021 12:07 AM EDT  Subject: Ozempic Rx    I’m not sure where the Ozempic RX was sent but I’ve been having trouble having it filled. I should have had a new RX for two 90 RX’s with a dosage of 50 mg? Per week. [facility]? Thanks

## 2022-01-18 DIAGNOSIS — I10 ESSENTIAL HYPERTENSION: ICD-10-CM

## 2022-01-18 DIAGNOSIS — E11.9 TYPE 2 DIABETES MELLITUS WITHOUT COMPLICATION, WITHOUT LONG-TERM CURRENT USE OF INSULIN: ICD-10-CM

## 2022-01-18 DIAGNOSIS — E78.2 MIXED HYPERLIPIDEMIA: ICD-10-CM

## 2022-01-18 RX ORDER — ATORVASTATIN CALCIUM 20 MG/1
TABLET, FILM COATED ORAL
Qty: 90 TABLET | Refills: 0 | Status: SHIPPED | OUTPATIENT
Start: 2022-01-18 | End: 2022-03-04 | Stop reason: SDUPTHER

## 2022-01-18 RX ORDER — LISINOPRIL 20 MG/1
TABLET ORAL
Qty: 180 TABLET | Refills: 0 | Status: SHIPPED | OUTPATIENT
Start: 2022-01-18 | End: 2022-03-04 | Stop reason: SDUPTHER

## 2022-03-04 DIAGNOSIS — E11.9 TYPE 2 DIABETES MELLITUS WITHOUT COMPLICATION, WITHOUT LONG-TERM CURRENT USE OF INSULIN: ICD-10-CM

## 2022-03-04 DIAGNOSIS — I10 ESSENTIAL HYPERTENSION: ICD-10-CM

## 2022-03-04 DIAGNOSIS — E78.2 MIXED HYPERLIPIDEMIA: ICD-10-CM

## 2022-03-04 DIAGNOSIS — B35.1 ONYCHOMYCOSIS: ICD-10-CM

## 2022-03-04 RX ORDER — LISINOPRIL 20 MG/1
20 TABLET ORAL 2 TIMES DAILY
Qty: 180 TABLET | Refills: 0 | Status: SHIPPED | OUTPATIENT
Start: 2022-03-04 | End: 2022-08-26 | Stop reason: SDUPTHER

## 2022-03-04 RX ORDER — TERAZOSIN 2 MG/1
2 CAPSULE ORAL NIGHTLY
Qty: 180 CAPSULE | Refills: 0 | Status: SHIPPED | OUTPATIENT
Start: 2022-03-04 | End: 2022-08-26 | Stop reason: SDUPTHER

## 2022-03-04 RX ORDER — SEMAGLUTIDE 1.34 MG/ML
0.5 INJECTION, SOLUTION SUBCUTANEOUS WEEKLY
Qty: 4 PEN | Refills: 1 | Status: SHIPPED | OUTPATIENT
Start: 2022-03-04 | End: 2023-01-09

## 2022-03-04 RX ORDER — METFORMIN HYDROCHLORIDE 500 MG/1
1000 TABLET, EXTENDED RELEASE ORAL
Qty: 360 TABLET | Refills: 0 | Status: SHIPPED | OUTPATIENT
Start: 2022-03-04 | End: 2022-08-26 | Stop reason: SDUPTHER

## 2022-03-04 RX ORDER — ICOSAPENT ETHYL 1000 MG/1
2 CAPSULE ORAL DAILY
Qty: 180 CAPSULE | Refills: 0 | Status: SHIPPED | OUTPATIENT
Start: 2022-03-04 | End: 2022-08-26 | Stop reason: SDUPTHER

## 2022-03-04 RX ORDER — FENOFIBRATE 160 MG/1
160 TABLET ORAL DAILY
Qty: 90 TABLET | Refills: 0 | Status: SHIPPED | OUTPATIENT
Start: 2022-03-04 | End: 2022-09-11 | Stop reason: SDUPTHER

## 2022-03-04 RX ORDER — BLOOD SUGAR DIAGNOSTIC
STRIP MISCELLANEOUS
Qty: 100 EACH | Refills: 12 | Status: SHIPPED | OUTPATIENT
Start: 2022-03-04

## 2022-03-04 RX ORDER — ATORVASTATIN CALCIUM 20 MG/1
20 TABLET, FILM COATED ORAL NIGHTLY
Qty: 90 TABLET | Refills: 0 | Status: SHIPPED | OUTPATIENT
Start: 2022-03-04 | End: 2022-08-26 | Stop reason: SDUPTHER

## 2022-03-04 RX ORDER — LISINOPRIL AND HYDROCHLOROTHIAZIDE 25; 20 MG/1; MG/1
1 TABLET ORAL DAILY
Qty: 90 TABLET | Refills: 0 | Status: SHIPPED | OUTPATIENT
Start: 2022-03-04 | End: 2022-08-26 | Stop reason: SDUPTHER

## 2022-03-04 RX ORDER — GLYBURIDE 2.5 MG/1
2.5 TABLET ORAL 2 TIMES DAILY WITH MEALS
Qty: 180 TABLET | Refills: 0 | Status: SHIPPED | OUTPATIENT
Start: 2022-03-04 | End: 2022-08-26 | Stop reason: SDUPTHER

## 2022-05-22 DIAGNOSIS — I10 ESSENTIAL HYPERTENSION: ICD-10-CM

## 2022-05-22 DIAGNOSIS — E78.2 MIXED HYPERLIPIDEMIA: ICD-10-CM

## 2022-05-22 DIAGNOSIS — E11.9 TYPE 2 DIABETES MELLITUS WITHOUT COMPLICATION, WITHOUT LONG-TERM CURRENT USE OF INSULIN: ICD-10-CM

## 2022-05-22 RX ORDER — LISINOPRIL AND HYDROCHLOROTHIAZIDE 25; 20 MG/1; MG/1
TABLET ORAL
Qty: 90 TABLET | Refills: 0 | OUTPATIENT
Start: 2022-05-22

## 2022-05-22 RX ORDER — GLYBURIDE 2.5 MG/1
TABLET ORAL
Qty: 180 TABLET | Refills: 0 | OUTPATIENT
Start: 2022-05-22

## 2022-05-22 RX ORDER — ATORVASTATIN CALCIUM 20 MG/1
TABLET, FILM COATED ORAL
Qty: 90 TABLET | Refills: 0 | OUTPATIENT
Start: 2022-05-22

## 2022-05-22 RX ORDER — METFORMIN HYDROCHLORIDE 500 MG/1
1000 TABLET, EXTENDED RELEASE ORAL
Qty: 360 TABLET | Refills: 0 | OUTPATIENT
Start: 2022-05-22

## 2022-05-22 RX ORDER — LISINOPRIL 40 MG/1
TABLET ORAL
Qty: 90 TABLET | OUTPATIENT
Start: 2022-05-22

## 2022-05-22 RX ORDER — ICOSAPENT ETHYL 1000 MG/1
CAPSULE ORAL
Qty: 180 CAPSULE | Refills: 0 | OUTPATIENT
Start: 2022-05-22

## 2022-08-16 DIAGNOSIS — I10 ESSENTIAL HYPERTENSION: ICD-10-CM

## 2022-08-17 RX ORDER — TERAZOSIN 2 MG/1
CAPSULE ORAL
Qty: 90 CAPSULE | Refills: 1 | OUTPATIENT
Start: 2022-08-17

## 2022-08-26 ENCOUNTER — TELEPHONE (OUTPATIENT)
Dept: INTERNAL MEDICINE | Facility: CLINIC | Age: 57
End: 2022-08-26

## 2022-08-26 DIAGNOSIS — E78.2 MIXED HYPERLIPIDEMIA: ICD-10-CM

## 2022-08-26 DIAGNOSIS — E11.9 TYPE 2 DIABETES MELLITUS WITHOUT COMPLICATION, WITHOUT LONG-TERM CURRENT USE OF INSULIN: ICD-10-CM

## 2022-08-26 DIAGNOSIS — I10 ESSENTIAL HYPERTENSION: ICD-10-CM

## 2022-08-26 RX ORDER — ATORVASTATIN CALCIUM 20 MG/1
20 TABLET, FILM COATED ORAL NIGHTLY
Qty: 30 TABLET | Refills: 0 | Status: SHIPPED | OUTPATIENT
Start: 2022-08-26 | End: 2022-09-10 | Stop reason: SDUPTHER

## 2022-08-26 RX ORDER — GLYBURIDE 2.5 MG/1
TABLET ORAL
Qty: 180 TABLET | OUTPATIENT
Start: 2022-08-26

## 2022-08-26 RX ORDER — LISINOPRIL AND HYDROCHLOROTHIAZIDE 25; 20 MG/1; MG/1
TABLET ORAL
Qty: 90 TABLET | OUTPATIENT
Start: 2022-08-26

## 2022-08-26 RX ORDER — METFORMIN HYDROCHLORIDE 500 MG/1
500 TABLET, EXTENDED RELEASE ORAL
Qty: 180 TABLET | Refills: 0 | Status: SHIPPED | OUTPATIENT
Start: 2022-08-26 | End: 2022-08-26 | Stop reason: SDUPTHER

## 2022-08-26 RX ORDER — LISINOPRIL AND HYDROCHLOROTHIAZIDE 25; 20 MG/1; MG/1
1 TABLET ORAL DAILY
Qty: 30 TABLET | Refills: 0 | Status: SHIPPED | OUTPATIENT
Start: 2022-08-26 | End: 2022-09-10 | Stop reason: SDUPTHER

## 2022-08-26 RX ORDER — LISINOPRIL 20 MG/1
20 TABLET ORAL 2 TIMES DAILY
Qty: 60 TABLET | Refills: 0 | Status: SHIPPED | OUTPATIENT
Start: 2022-08-26 | End: 2022-09-10 | Stop reason: SDUPTHER

## 2022-08-26 RX ORDER — ATORVASTATIN CALCIUM 20 MG/1
TABLET, FILM COATED ORAL
Qty: 90 TABLET | OUTPATIENT
Start: 2022-08-26

## 2022-08-26 NOTE — TELEPHONE ENCOUNTER
Caller: Harpreet Calvillo    Relationship: Self    Best call back number:[phone]    Requested Prescriptions:   Requested Prescriptions     Pending Prescriptions Disp Refills   • atorvastatin (LIPITOR) 20 MG tablet 90 tablet 0     Sig: Take 1 tablet by mouth Every Night.   • lisinopril (PRINIVIL,ZESTRIL) 20 MG tablet 180 tablet 0     Sig: Take 1 tablet by mouth 2 (Two) Times a Day.   • lisinopril-hydrochlorothiazide (PRINZIDE,ZESTORETIC) 20-25 MG per tablet 90 tablet 0     Sig: Take 1 tablet by mouth Daily.   • metFORMIN ER (Glucophage XR) 500 MG 24 hr tablet 360 tablet 0     Sig: Take 2 tablets by mouth Daily With Breakfast & Dinner.        Pharmacy where request should be sent: [facility] #[id number] [place], [place] - [address]  - [phone] [facility] [phone] FX     Does the patient have less than a 3 day supply:  [x] Yes  [] No    Romario Bernal   08/26/22 09:57 EDT

## 2022-09-10 ENCOUNTER — OFFICE VISIT (OUTPATIENT)
Dept: INTERNAL MEDICINE | Facility: CLINIC | Age: 57
End: 2022-09-10

## 2022-09-10 VITALS
WEIGHT: 300 LBS | OXYGEN SATURATION: 95 % | TEMPERATURE: 97.7 F | HEIGHT: 68 IN | SYSTOLIC BLOOD PRESSURE: 150 MMHG | RESPIRATION RATE: 16 BRPM | BODY MASS INDEX: 45.47 KG/M2 | HEART RATE: 82 BPM | DIASTOLIC BLOOD PRESSURE: 84 MMHG

## 2022-09-10 DIAGNOSIS — Z76.89 ENCOUNTER TO ESTABLISH CARE: ICD-10-CM

## 2022-09-10 DIAGNOSIS — E78.2 MIXED HYPERLIPIDEMIA: ICD-10-CM

## 2022-09-10 DIAGNOSIS — E66.01 MORBID (SEVERE) OBESITY DUE TO EXCESS CALORIES: ICD-10-CM

## 2022-09-10 DIAGNOSIS — I10 ESSENTIAL HYPERTENSION: ICD-10-CM

## 2022-09-10 DIAGNOSIS — E11.9 TYPE 2 DIABETES MELLITUS WITHOUT COMPLICATION, WITHOUT LONG-TERM CURRENT USE OF INSULIN: Primary | ICD-10-CM

## 2022-09-10 DIAGNOSIS — E11.9 TYPE 2 DIABETES MELLITUS WITHOUT COMPLICATION, WITHOUT LONG-TERM CURRENT USE OF INSULIN: ICD-10-CM

## 2022-09-10 DIAGNOSIS — Z12.5 SCREENING PSA (PROSTATE SPECIFIC ANTIGEN): ICD-10-CM

## 2022-09-10 LAB
EXPIRATION DATE: ABNORMAL
HBA1C MFR BLD: 10.7 %
Lab: ABNORMAL

## 2022-09-10 PROCEDURE — 83036 HEMOGLOBIN GLYCOSYLATED A1C: CPT | Performed by: NURSE PRACTITIONER

## 2022-09-10 PROCEDURE — 99214 OFFICE O/P EST MOD 30 MIN: CPT | Performed by: NURSE PRACTITIONER

## 2022-09-10 RX ORDER — ALBUTEROL SULFATE 90 UG/1
2 AEROSOL, METERED RESPIRATORY (INHALATION) EVERY 4 HOURS PRN
Qty: 18 G | Refills: 11 | Status: SHIPPED | OUTPATIENT
Start: 2022-09-10 | End: 2022-12-17 | Stop reason: SDUPTHER

## 2022-09-10 RX ORDER — TERAZOSIN 2 MG/1
CAPSULE ORAL
Qty: 60 CAPSULE | Refills: 2 | Status: SHIPPED | OUTPATIENT
Start: 2022-09-10 | End: 2022-12-20 | Stop reason: SDUPTHER

## 2022-09-10 RX ORDER — LISINOPRIL 20 MG/1
20 TABLET ORAL 2 TIMES DAILY
Qty: 60 TABLET | Refills: 0 | Status: SHIPPED | OUTPATIENT
Start: 2022-09-10 | End: 2022-12-20 | Stop reason: SDUPTHER

## 2022-09-10 RX ORDER — ICOSAPENT ETHYL 1000 MG/1
2 CAPSULE ORAL DAILY
Qty: 60 CAPSULE | Refills: 2 | Status: SHIPPED | OUTPATIENT
Start: 2022-09-10 | End: 2022-12-20 | Stop reason: SDUPTHER

## 2022-09-10 RX ORDER — ATORVASTATIN CALCIUM 20 MG/1
20 TABLET, FILM COATED ORAL NIGHTLY
Qty: 30 TABLET | Refills: 2 | Status: SHIPPED | OUTPATIENT
Start: 2022-09-10 | End: 2022-12-20 | Stop reason: SDUPTHER

## 2022-09-10 RX ORDER — LISINOPRIL AND HYDROCHLOROTHIAZIDE 25; 20 MG/1; MG/1
1 TABLET ORAL DAILY
Qty: 30 TABLET | Refills: 2 | Status: SHIPPED | OUTPATIENT
Start: 2022-09-10 | End: 2022-12-20 | Stop reason: SDUPTHER

## 2022-09-10 RX ORDER — METFORMIN HYDROCHLORIDE 500 MG/1
TABLET, EXTENDED RELEASE ORAL
Qty: 120 TABLET | Refills: 2 | Status: SHIPPED | OUTPATIENT
Start: 2022-09-10 | End: 2022-12-20 | Stop reason: SDUPTHER

## 2022-09-10 RX ORDER — GLYBURIDE 2.5 MG/1
2.5 TABLET ORAL 2 TIMES DAILY WITH MEALS
Qty: 60 TABLET | Refills: 2 | Status: SHIPPED | OUTPATIENT
Start: 2022-09-10 | End: 2022-12-20 | Stop reason: SDUPTHER

## 2022-09-10 NOTE — PROGRESS NOTES
Follow Up Office Visit      Date: 09/10/2022   Patient Name: Haprreet Calvillo  : 1965   MRN: [id number]     Chief Complaint:    Chief Complaint   Patient presents with   • Establish Care   • Hypertension   • Hyperlipidemia   • Diabetes       History of Present Illness: Harpreet Calvillo is a 56 y.o. male who is here today to follow up and requires medication refills. His last visit in the office was 2021. He is overdue for his annual physical. His occupation is  and he helps raise his grandson. Both of these factors make coming in for regular (frequent) follow up apts difficult. He has been without some medications and also reports previously not being consistently compliant with taking all meds as prescribed. He does care about his health and wishes to comply with a treatment plan that best promotes his health and well being.     HLD- This is a chronic problem. Current antihyperlipidemic treatment includes statins, fibric acid derivatives, vaspeca, and lifestyle modifications. Exacerbating diseases include diabetes and obesity. Compliance problems include adherence to diet and adherence to exercise. Last lipid panel was obtained 3/2021.     HTN- Chronic problem. No history of CAD, angina, or MI. Current antihypertension treatment includes ACE inhibitors, diuretic, and alpha 1 blocker.    DM2- Chronic problem. A1C today= 10.7% (last 8.5% on 21). There are hypoglycemic associated symptoms or complications. Pertinent negatives for include no chest pain, no fatigue, no foot paresthesias, no foot ulcerations, and no visual change. He endorses previous visual changes which he attributes to ozempic and subsequently discontinued this medication. He does not wish to resume this medication in the future or any other formulary/med in the same drug class at any point in the future. Risk factors for coronary artery disease include diabetes mellitus, dyslipidemia, male sex, hypertension, sedentary  lifestyle and obesity.   Current diabetic tx includes diet and oral agent (dual therapy). States that diet has been poor as of late without regular exercise. He does note that previously he went to the gym and regularly walked on treadmill. While doing this, he experienced notable improvements and hopes to get back in routine or possibly purchase a treadmill for his home.  An ACE inhibitor/angiotensin II receptor blocker is being taken.     Sleep Apnea- compliant with CPAP. As a , he takes sleep very seriously and is contentious of efforts to promote regular, sound sleep.     ED- Chronic dx, although pt reports he does not feel this is a problem he is concerned with at this time. Currently prescribed sildenafil 20mg tablets/ si tabs PO daily PRN ED. The treatment has previously yielded moderate relief in ED symptoms, and he has had no adverse reactions caused by medications. However, he does acknowledge notable improvement in HTN when taking this medication and sometimes uses it for purpose of lowering elevated blood pressure.       Subjective      Review of Systems:   Review of Systems   Constitutional: Negative for chills, fatigue and fever.   HENT: Negative for congestion, postnasal drip, rhinorrhea, sinus pressure and sneezing.    Respiratory: Negative for cough, shortness of breath and wheezing.    Cardiovascular: Negative for chest pain, palpitations and leg swelling.   Gastrointestinal: Negative for abdominal pain, blood in stool, constipation, diarrhea, nausea and vomiting.   Endocrine: Negative for polydipsia, polyphagia and polyuria.   Genitourinary: Negative for dysuria and frequency.   Musculoskeletal: Negative for arthralgias, back pain, myalgias and neck pain.   Skin: Negative for rash, skin lesions, wound and bruise.   Neurological: Negative for dizziness, weakness, numbness and headache.   Hematological: Negative for adenopathy. Does not bruise/bleed easily.   Psychiatric/Behavioral:  Negative for sleep disturbance and depressed mood. The patient is not nervous/anxious.        I have reviewed the patients family history, social history, past medical history, past surgical history and have updated it as appropriate.     Medications:     Current Outpatient Medications:   •  albuterol sulfate  (90 Base) MCG/ACT inhaler, Inhale 2 puffs Every 4 (Four) Hours As Needed (anal spasms)., Disp: 18 g, Rfl: 11  •  atorvastatin (LIPITOR) 20 MG tablet, Take 1 tablet by mouth Every Night., Disp: 30 tablet, Rfl: 2  •  ciclopirox (PENLAC) 8 % solution, Apply  topically to the appropriate area as directed Every Night., Disp: 12 mL, Rfl: 1  •  glucose blood (Accu-Chek Shyla Plus) test strip, Use as instructed; check blood glucose 3 times per day, Disp: 100 each, Rfl: 12  •  glyburide (DIAbeta) 2.5 MG tablet, Take 1 tablet by mouth 2 (Two) Times a Day With Meals., Disp: 60 tablet, Rfl: 2  •  icosapent ethyl (Vascepa) 1 g capsule capsule, Take 2 g by mouth Daily., Disp: 60 capsule, Rfl: 2  •  lisinopril (PRINIVIL,ZESTRIL) 20 MG tablet, Take 1 tablet by mouth 2 (Two) Times a Day., Disp: 60 tablet, Rfl: 0  •  lisinopril-hydrochlorothiazide (PRINZIDE,ZESTORETIC) 20-25 MG per tablet, Take 1 tablet by mouth Daily., Disp: 30 tablet, Rfl: 2  •  metFORMIN ER (Glucophage XR) 500 MG 24 hr tablet, Take 2 tabs daily with breakfast and dinner, Disp: 120 tablet, Rfl: 2  •  sildenafil (REVATIO) 20 MG tablet, Take 5 tablets by mouth Daily As Needed (ED)., Disp: 90 tablet, Rfl: 2  •  terazosin (HYTRIN) 2 MG capsule, Take 1 tab bid, Disp: 60 capsule, Rfl: 2  •  fenofibrate 160 MG tablet, Take 1 tablet by mouth Daily., Disp: 90 tablet, Rfl: 0  •  Semaglutide,0.25 or 0.5MG/DOS, (Ozempic, 0.25 or 0.5 MG/DOSE,) 2 MG/1.5ML solution pen-injector, Inject 0.5 mg under the skin into the appropriate area as directed 1 (One) Time Per Week., Disp: 4 pen, Rfl: 1  •  Testosterone 200 MG pellet, by Implant route., Disp: , Rfl:     Allergies:  "  No Known Allergies    Objective     Physical Exam: Please see above  Vital Signs:   Vitals:    09/10/22 1135   BP: 150/84   Pulse: 82   Resp: 16   Temp: 97.7 °F (36.5 °C)   SpO2: 95%   Weight: 136 kg (300 lb)   Height: 172.7 cm (68\")   PainSc: 0-No pain     Body mass index is 45.61 kg/m².    Physical Exam  Vitals and nursing note reviewed.   Constitutional:       General: He is not in acute distress.     Appearance: Normal appearance. He is obese. He is not ill-appearing.   HENT:      Head: Normocephalic and atraumatic.      Mouth/Throat:      Mouth: Mucous membranes are moist.      Pharynx: Oropharynx is clear. No oropharyngeal exudate or posterior oropharyngeal erythema.   Eyes:      Pupils: Pupils are equal, round, and reactive to light.   Cardiovascular:      Rate and Rhythm: Normal rate and regular rhythm.      Heart sounds: Normal heart sounds.   Pulmonary:      Effort: Pulmonary effort is normal. No respiratory distress.      Breath sounds: Normal breath sounds.   Musculoskeletal:      Right lower leg: Edema (+1) present.      Left lower leg: Edema (+1) present.   Skin:     General: Skin is warm and dry.      Capillary Refill: Capillary refill takes less than 2 seconds.   Neurological:      General: No focal deficit present.      Mental Status: He is alert and oriented to person, place, and time. Mental status is at baseline.   Psychiatric:         Mood and Affect: Mood normal.         Behavior: Behavior normal.         Thought Content: Thought content normal.         Judgment: Judgment normal.         Procedures    Results:   Imaging:     Labs:   reviewed     Assessment / Plan      Assessment/Plan:   Diagnoses and all orders for this visit:    1. Type 2 diabetes mellitus without complication, without long-term current use of insulin (HCC) (Primary)  -     POC Glycosylated Hemoglobin (Hb A1C)= 10.7%, increased from prior  -     metFORMIN ER (Glucophage XR) 500 MG 24 hr tablet; Take 2 tabs daily with " breakfast and dinner  Dispense: 120 tablet; Refill: 2  -     glyburide (DIAbeta) 2.5 MG tablet; Take 1 tablet by mouth 2 (Two) Times a Day With Meals.  Dispense: 60 tablet; Refill: 2  -   Avoid sugary foods/drinks, limit carbs. Goal <6.5%.  -implement regular exercise  -weight loss   -Ozempic has been d/c'd by patient. Current uncontrolled disease warrants replacement of third agent. Will contact him with recommendations after reviewing records in more detail.   -recheck in 3m, sooner for persistent uncontrolled bg levels or concerning s/s  -referred to ophthalmology for annual diabetic eye exam at last visits  -will update diabetic foot exam and urine microalbumin during annual physical on return       2. Essential hypertension  Comments:   Goal BP< 130/80; cont DASH diet and limit/reduce etoh.  Orders:  -     terazosin (HYTRIN) 2 MG capsule; Take 1 tab bid  Dispense: 60 capsule; Refill: 2  -     lisinopril-hydrochlorothiazide (PRINZIDE,ZESTORETIC) 20-25 MG per tablet; Take 1 tablet by mouth Daily.  Dispense: 30 tablet; Refill: 2  -     lisinopril (PRINIVIL,ZESTRIL) 20 MG tablet; Take 1 tablet by mouth 2 (Two) Times a Day.  Dispense: 60 tablet; Refill: 0  -I will re-assess BP once he is consistently back on all previously prescribed meds   -If additional medication/ dose adjustment is warranted, I will review repeat chemistry from today before deciding on treatment change       5. Mixed hyperlipidemia  Comments:  check fasting lipids on current regimen. Avoid fried, fatty, and processed foods. Resume regular daily walks, increasing pace/duration as you build endurance. Guideline recommendation is at least 150 minutes of moderate intensity or 75 minutes of vigorous intensity aerobic physical activity per week.   Orders:  -     icosapent ethyl (Vascepa) 1 g capsule capsule; Take 2 g by mouth Daily.  Dispense: 60 capsule; Refill: 2  -     atorvastatin (LIPITOR) 20 MG tablet; Take 1 tablet by mouth Every Night.   Dispense: 30 tablet; Refill: 2  -fenofibrate 160mg PO daily     6. Encounter to establish care/ Health Maintenance   -  Will update overdue labs at this time: CBC, CMP, lipid panel, PSA, TSH. I will await repeat lab results to make any further medication adjustments aside from DM2 treatment changes.   - HCV: never performed. Suggested we update at this time. He politely declined, in attempt to keep lab bill as low as possible. As he is low risk, this is ok to defer. I will plan to re-adress on return for annual.     -  Will update 10yr CVD risk  with repeat labs today, and plan to discuss/ implement appropriate intervention based on result %  -  Colonoscopy: due 2026  -immunizations:  -depression screenin. Screening PSA (prostate specific antigen)  -     PSA Screen; Future  -will offer ISIAAS on return during annual physical exam, or sooner if appropriate based on repeat PSA level.       8. Onychomycosis  -ALT slightly above normal range on last check in 3/2021. Repeat CMP ordered today. Would need to confirm normalization of LFTs prior to considering alternative treatment than topical penlac solution- should this be necessary/ desired by Cristobal.     9. Obesity, BMI= 45.61  -In addition to the above noted dietary and exercise recommendations as part of treatment plan for chronic diseases, I will plan to discuss options specifically for weight loss treatment plan in attempt to achieve a healthy BMI.       Other orders  -  Refilled:   albuterol sulfate  (90 Base) MCG/ACT inhaler; Inhale 2 puffs Every 4 (Four) Hours As Needed (anal spasms).        Follow Up:   Return in about 3 months (around 12/10/2022) for Annual.  Pt requests labs be obtained through [facility]. A copy of orders were provided at check out today. He wishes to maintain 6 month follow-up visits, but is agreeable to return in 3 months after discussion/ concern related to BP reading/ HbA1C obtained in office today. We will plan for annual  physical on return. He is agreeable to continue monitoring BP and BG levels at home. He will contact me for earlier apt, as appropriate based on abnormal readings, or other symptoms/ concerns that arise.       PHILIPPE Sylvester  [facility] [place]

## 2022-09-11 DIAGNOSIS — I10 HYPERTENSION, UNSPECIFIED TYPE: Primary | ICD-10-CM

## 2022-09-11 RX ORDER — FENOFIBRATE 160 MG/1
160 TABLET ORAL DAILY
Qty: 90 TABLET | Refills: 1 | Status: SHIPPED | OUTPATIENT
Start: 2022-09-11 | End: 2023-01-09 | Stop reason: SDUPTHER

## 2022-09-11 RX ORDER — AMLODIPINE BESYLATE 5 MG/1
5 TABLET ORAL DAILY
Qty: 90 TABLET | Refills: 0 | Status: SHIPPED | OUTPATIENT
Start: 2022-09-11 | End: 2022-12-17 | Stop reason: DRUGHIGH

## 2022-09-16 ENCOUNTER — TELEPHONE (OUTPATIENT)
Dept: INTERNAL MEDICINE | Facility: CLINIC | Age: 57
End: 2022-09-16

## 2022-09-16 NOTE — TELEPHONE ENCOUNTER
Caller: Harpreet Calvillo    Relationship: Self    Best call back number: [phone]    What is the best time to reach you: AS SOON AS POSSIBLE     Who are you requesting to speak with (clinical staff, provider,  specific staff member): CLINICAL STAFF        What was the call regarding: THE PATIENT WOULD LIKE TO KNOW IF HE NEEDS TO FAST FOR HIS BLOOD WORK HE STATES THAT HE HAS ALREADY DONE HIS A1C BLOOD WORK [facility]     Do you require a callback:YES

## 2022-09-22 DIAGNOSIS — E11.9 TYPE 2 DIABETES MELLITUS WITHOUT COMPLICATION, WITHOUT LONG-TERM CURRENT USE OF INSULIN: ICD-10-CM

## 2022-09-22 DIAGNOSIS — I10 ESSENTIAL HYPERTENSION: ICD-10-CM

## 2022-09-22 LAB
ALBUMIN SERPL-MCNC: 4.8 G/DL (ref 3.8–4.9)
ALBUMIN/GLOB SERPL: 2.4 {RATIO} (ref 1.2–2.2)
ALP SERPL-CCNC: 65 IU/L (ref 44–121)
ALT SERPL-CCNC: 44 IU/L (ref 0–44)
AST SERPL-CCNC: 33 IU/L (ref 0–40)
BILIRUB SERPL-MCNC: 0.5 MG/DL (ref 0–1.2)
BUN SERPL-MCNC: 21 MG/DL (ref 6–24)
BUN/CREAT SERPL: 23 (ref 9–20)
CALCIUM SERPL-MCNC: 9.6 MG/DL (ref 8.7–10.2)
CHLORIDE SERPL-SCNC: 98 MMOL/L (ref 96–106)
CHOLEST SERPL-MCNC: 131 MG/DL (ref 100–199)
CO2 SERPL-SCNC: 21 MMOL/L (ref 20–29)
CREAT SERPL-MCNC: 0.91 MG/DL (ref 0.76–1.27)
EGFRCR SERPLBLD CKD-EPI 2021: 100 ML/MIN/1.73
ERYTHROCYTE [DISTWIDTH] IN BLOOD BY AUTOMATED COUNT: 13.3 % (ref 11.6–15.4)
GLOBULIN SER CALC-MCNC: 2 G/DL (ref 1.5–4.5)
GLUCOSE SERPL-MCNC: 85 MG/DL (ref 65–99)
HCT VFR BLD AUTO: 42.9 % (ref 37.5–51)
HDLC SERPL-MCNC: 38 MG/DL
HGB BLD-MCNC: 14.6 G/DL (ref 13–17.7)
LDLC SERPL CALC-MCNC: 57 MG/DL (ref 0–99)
MCH RBC QN AUTO: 29.1 PG (ref 26.6–33)
MCHC RBC AUTO-ENTMCNC: 34 G/DL (ref 31.5–35.7)
MCV RBC AUTO: 86 FL (ref 79–97)
POTASSIUM SERPL-SCNC: 4.2 MMOL/L (ref 3.5–5.2)
PROT SERPL-MCNC: 6.8 G/DL (ref 6–8.5)
PSA FREE MFR SERPL: 40 %
PSA FREE SERPL-MCNC: 0.32 NG/ML
PSA SERPL-MCNC: 0.8 NG/ML (ref 0–4)
RBC # BLD AUTO: 5.02 X10E6/UL (ref 4.14–5.8)
SODIUM SERPL-SCNC: 138 MMOL/L (ref 134–144)
TRIGL SERPL-MCNC: 220 MG/DL (ref 0–149)
TSH SERPL DL<=0.005 MIU/L-ACNC: 1.5 UIU/ML (ref 0.45–4.5)
VLDLC SERPL CALC-MCNC: 36 MG/DL (ref 5–40)
WBC # BLD AUTO: 7.5 X10E3/UL (ref 3.4–10.8)

## 2022-09-22 RX ORDER — LISINOPRIL 40 MG/1
TABLET ORAL
Qty: 30 TABLET | OUTPATIENT
Start: 2022-09-22

## 2022-11-02 ENCOUNTER — TELEMEDICINE (OUTPATIENT)
Dept: SLEEP MEDICINE | Facility: CLINIC | Age: 57
End: 2022-11-02

## 2022-11-02 DIAGNOSIS — G47.33 OSA (OBSTRUCTIVE SLEEP APNEA): Primary | ICD-10-CM

## 2022-11-02 PROCEDURE — 99213 OFFICE O/P EST LOW 20 MIN: CPT | Performed by: NURSE PRACTITIONER

## 2022-11-03 PROBLEM — G47.33 OSA (OBSTRUCTIVE SLEEP APNEA): Status: ACTIVE | Noted: 2022-11-03

## 2022-11-03 NOTE — PROGRESS NOTES
[facility] Initial Evaluation    CHIEF COMPLAINT    EDUARDO    Referred by:  Self referral    HISTORY OF PRESENT ILLNESS    Harpreet Calvillo is a 56 y.o.male here today for initial evaluation of his sleeping problem.  He states he has been on a CPAP machine for many years.  He had been following with [facility] but states that he wanted to establish care with our office.    He recently received a new Bipap machine due to the recall.  He currently uses a Mirage Quatro medium facemask.  He does wear his Bipap every night and does benefit from CPAP therapy.  He denies any sleeping difficulties.  He denies any daytime somnolence or fatigue.  He has never had to use medication aids to help him fall asleep.    He states his original sleep study did show an AHI of 90 but does not recall when the study was performed.  He has had his machine for at least 15 years.    He states his hours do very but he does try to get an average of 6-1/2 hours every night of sleep.  He is a  and does stay compliant with his BiPAP due to this.    He denies chest pain or palpitations.  He denies any lower extremity edema or calf tenderness.    He does try to limit his caffeine use.  He typically drinks 1 cup of coffee in the morning and 1 to 2 cans of diet cola in the afternoons.    He denies any seasonal allergies.  He does have a history of diabetes and high blood pressure.  He denies any deviated septum or sinus surgeries.    His York Sleepiness Scale is 7/24    Patient Active Problem List   Diagnosis   • EDUARDO (obstructive sleep apnea)       No Known Allergies    Current Outpatient Medications:   •  albuterol sulfate  (90 Base) MCG/ACT inhaler, Inhale 2 puffs Every 4 (Four) Hours As Needed (anal spasms)., Disp: 18 g, Rfl: 11  •  amLODIPine (NORVASC) 5 MG tablet, Take 1 tablet by mouth Daily., Disp: 90 tablet, Rfl: 0  •  atorvastatin (LIPITOR) 20 MG tablet, Take 1 tablet by mouth Every Night., Disp: 30  tablet, Rfl: 2  •  ciclopirox (PENLAC) 8 % solution, Apply  topically to the appropriate area as directed Every Night., Disp: 12 mL, Rfl: 1  •  fenofibrate 160 MG tablet, Take 1 tablet by mouth Daily., Disp: 90 tablet, Rfl: 1  •  glucose blood (Accu-Chek Shyla Plus) test strip, Use as instructed; check blood glucose 3 times per day, Disp: 100 each, Rfl: 12  •  glyburide (DIAbeta) 2.5 MG tablet, Take 1 tablet by mouth 2 (Two) Times a Day With Meals., Disp: 60 tablet, Rfl: 2  •  icosapent ethyl (Vascepa) 1 g capsule capsule, Take 2 g by mouth Daily., Disp: 60 capsule, Rfl: 2  •  lisinopril (PRINIVIL,ZESTRIL) 20 MG tablet, Take 1 tablet by mouth 2 (Two) Times a Day., Disp: 60 tablet, Rfl: 0  •  lisinopril-hydrochlorothiazide (PRINZIDE,ZESTORETIC) 20-25 MG per tablet, Take 1 tablet by mouth Daily., Disp: 30 tablet, Rfl: 2  •  metFORMIN ER (Glucophage XR) 500 MG 24 hr tablet, Take 2 tabs daily with breakfast and dinner, Disp: 120 tablet, Rfl: 2  •  Semaglutide,0.25 or 0.5MG/DOS, (Ozempic, 0.25 or 0.5 MG/DOSE,) 2 MG/1.5ML solution pen-injector, Inject 0.5 mg under the skin into the appropriate area as directed 1 (One) Time Per Week., Disp: 4 pen, Rfl: 1  •  sildenafil (REVATIO) 20 MG tablet, Take 5 tablets by mouth Daily As Needed (ED)., Disp: 90 tablet, Rfl: 2  •  terazosin (HYTRIN) 2 MG capsule, Take 1 tab bid, Disp: 60 capsule, Rfl: 2  •  Testosterone 200 MG pellet, by Implant route., Disp: , Rfl:   MEDICATION LIST AND ALLERGIES REVIEWED.    Social History     Tobacco Use   • Smoking status: Never   • Smokeless tobacco: Never   Vaping Use   • Vaping Use: Never used   Substance Use Topics   • Alcohol use: Never   • Drug use: Never       FAMILY AND SOCIAL HISTORY REVIEWED.    Review of Systems   Constitutional: Negative for activity change, appetite change, fatigue, fever and unexpected weight change.   HENT: Positive for ear discharge. Negative for congestion, postnasal drip, rhinorrhea, sinus pressure, sore throat and  voice change.    Eyes: Negative for visual disturbance.   Respiratory: Negative for cough, chest tightness, shortness of breath and wheezing.    Cardiovascular: Negative for chest pain, palpitations and leg swelling.   Gastrointestinal: Negative for abdominal distention, abdominal pain, nausea and vomiting.   Endocrine: Negative for cold intolerance and heat intolerance.   Genitourinary: Negative for difficulty urinating and urgency.   Musculoskeletal: Negative for arthralgias, back pain and neck pain.   Skin: Negative for color change and pallor.   Allergic/Immunologic: Negative for environmental allergies and food allergies.   Neurological: Negative for dizziness, syncope, weakness and light-headedness.   Hematological: Negative for adenopathy. Does not bruise/bleed easily.   Psychiatric/Behavioral: Negative for agitation and behavioral problems.   .    There were no vitals taken for this visit.    Immunization History   Administered Date(s) Administered   • COVID-19 (PFIZER) PURPLE CAP 02/27/2021, 03/19/2021   • Flu Vaccine Quad PF >36MO 09/06/2016   • FluLaval/Fluzone >6mos 08/29/2020   • Hepatitis A 05/16/2018   • Influenza Quad Vaccine (Inpatient) 09/26/2017   • Influenza, Unspecified 08/29/2020, 10/01/2021   • Shingrix 10/25/2018, 10/15/2019   • Tdap 01/22/2017       Physical Exam    Unable to do physical exam due to telemedicine visit, patient was in no respiratory stress throughout the entire visit.    RESULTS    BiPAP download: Patient is on a air curve 10 via auto, average use is 7 hours and 14 minutes, patient is 87% compliant and average AHI is 0.3.    PROBLEM LIST    Problem List Items Addressed This Visit        Sleep    EDUARDO (obstructive sleep apnea) - Primary    Relevant Orders    PAP Therapy         DISCUSSION  You have chosen to receive care through a telehealth visit.  Do you consent to use a video/audio connection for your medical care today? Yes    Mr. Calvillo was here for initial evaluation of his  sleeping problem.  He does have a BiPAP currently and is in need of supplies for his machine.  He states that he recently got a new machine and it is working correctly.    I did review his download and he is compliant.  I will send in new orders for 1 year.  We will send his orders over to patient aids.    We did discuss good sleep regimen such as laying the lateral position, using his BiPAP every night a minimum of 6 hours, avoiding caffeine in the afternoons, avoiding naps in the afternoons, maintaining a regular sleep schedule, regular exercise and weight loss.    He will follow-up in 1 year for CPAP compliance check.  I will extend his new BiPAP orders for 1 year.  I did advise him to call with any additional concerns or questions.    I personally spent a total of 31 minutes on patient visit today including chart review, face to face with the patient obtaining the history and physical exam, review of pertinent images and tests, counseling and discussion and/or coordination of care as described above, and documentation.  Total time excludes time spent on other separate services such as performing procedures or test interpretation, if applicable.        PHILIPPE Otoole  11/02/202210:26 EDT  Electronically signed     Please note that portions of this note were completed with a voice recognition program.        CC: Diana Brooks APRN

## 2022-11-14 DIAGNOSIS — G47.33 OSA (OBSTRUCTIVE SLEEP APNEA): Primary | ICD-10-CM

## 2022-11-16 DIAGNOSIS — G47.33 OSA (OBSTRUCTIVE SLEEP APNEA): Primary | ICD-10-CM

## 2022-11-28 ENCOUNTER — TELEPHONE (OUTPATIENT)
Dept: INTERNAL MEDICINE | Facility: CLINIC | Age: 57
End: 2022-11-28

## 2022-11-28 NOTE — TELEPHONE ENCOUNTER
Caller: Harpreet Calvillo    Relationship: Self    Best call back number: [phone]    What medication are you requesting: ANYTHING TO HELP     What are your current symptoms: THROAT HURT WORSE THAN IT EVER HAS, NECK SWOLLEN, LYMPNOIDS SWOLLEN, DRY COUGH    Have you had these symptoms before:    [] Yes  [x] No    Have you been treated for these symptoms before:   [] Yes  [x] No    If a prescription is needed, what is your preferred pharmacy and phone number: [facility] [id number] - [address] [facility] [phone] [facility] [phone] FX     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

## 2022-11-28 NOTE — TELEPHONE ENCOUNTER
Called patient and advised need for an appointment.  Will go to [facility] for treatment.

## 2022-12-17 ENCOUNTER — OFFICE VISIT (OUTPATIENT)
Dept: INTERNAL MEDICINE | Facility: CLINIC | Age: 57
End: 2022-12-17
Payer: COMMERCIAL

## 2022-12-17 VITALS
OXYGEN SATURATION: 95 % | HEART RATE: 84 BPM | TEMPERATURE: 98.2 F | WEIGHT: 283 LBS | RESPIRATION RATE: 16 BRPM | SYSTOLIC BLOOD PRESSURE: 142 MMHG | HEIGHT: 68 IN | DIASTOLIC BLOOD PRESSURE: 80 MMHG | BODY MASS INDEX: 42.89 KG/M2

## 2022-12-17 DIAGNOSIS — E11.9 TYPE 2 DIABETES MELLITUS WITHOUT COMPLICATION, WITHOUT LONG-TERM CURRENT USE OF INSULIN: Primary | ICD-10-CM

## 2022-12-17 DIAGNOSIS — R05.1 ACUTE COUGH: ICD-10-CM

## 2022-12-17 DIAGNOSIS — I10 HYPERTENSION, UNSPECIFIED TYPE: ICD-10-CM

## 2022-12-17 DIAGNOSIS — Z00.00 HEALTH CARE MAINTENANCE: ICD-10-CM

## 2022-12-17 LAB
EXPIRATION DATE: ABNORMAL
HBA1C MFR BLD: 7.2 %
Lab: ABNORMAL

## 2022-12-17 PROCEDURE — 83036 HEMOGLOBIN GLYCOSYLATED A1C: CPT | Performed by: NURSE PRACTITIONER

## 2022-12-17 PROCEDURE — 99214 OFFICE O/P EST MOD 30 MIN: CPT | Performed by: NURSE PRACTITIONER

## 2022-12-17 RX ORDER — ALBUTEROL SULFATE 90 UG/1
2 AEROSOL, METERED RESPIRATORY (INHALATION) EVERY 4 HOURS PRN
Qty: 18 G | Refills: 11 | Status: SHIPPED | OUTPATIENT
Start: 2022-12-17

## 2022-12-17 RX ORDER — BENZONATATE 100 MG/1
100 CAPSULE ORAL 3 TIMES DAILY PRN
Qty: 21 CAPSULE | Refills: 0 | Status: SHIPPED | OUTPATIENT
Start: 2022-12-17 | End: 2022-12-20 | Stop reason: SDUPTHER

## 2022-12-17 RX ORDER — AMLODIPINE BESYLATE 10 MG/1
10 TABLET ORAL DAILY
Qty: 90 TABLET | Refills: 1 | Status: SHIPPED | OUTPATIENT
Start: 2022-12-17

## 2022-12-17 NOTE — PROGRESS NOTES
Follow Up Office Visit      Date: 2022   Patient Name: Harpreet Calvillo  : 1965   MRN: [id number]     Chief Complaint:    Chief Complaint   Patient presents with   • Diabetes   • Hypertension   • Nasal Congestion       History of Present Illness: Harpreet Calvillo is a 56 y.o. male who is here today to follow up with diabetes, hypertension and nasal congestion.    Diabetes  A1c has dropped from 10.7 percent to 7.2 percent.  States he started doing the keto diet to help his blood glucose before his last visit.  Has toenail infection on his left great toe that he has been treating with previously prescribed Rx (penlac solution) and this has improved nail significantly.  Has a glucometer at home to check blood glucose.   He has been dieting and walking regularly for exercise since his last visit, but recently stopped. 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 Pt notes that he wishes to be treated for chronic medical conditions assessed today with the mentality that he is non compliant with diet and exercise aspects of treatment plan.   He wants glyburide to be written in such a way he can take an extra dose if needed whenever he consumes heavy, unhealthy meal.    Hypertension  Notes he has been walking less.  Works as a .   States he had difficulty walking due to leg pain, but when he walked more (regularly) he felt a lot better.   Has not been checking his blood pressure regularly.   Denies any polydipsia at this time.    Medication  Confirms the use of glyburide every day.  Notes Vascepa has worked well for him in the past.   Reports that fenofibrate did not work for him in the past.  Would like to continue the use of Vascepa.  He has not been taking fenofibrate daily and restarted last week.  Confirms the use of amlodipine 5 mg Terazosin, and lisinopril HCTZ.  Confirms the use of Ozempic and specifically does not wish to resume this  or any other GLP1 med due to a rare s/e he experienced with his vision.  Staes he is open to trying Jardiance.  States he does not use albuterol as much, but would like to have it on file just in case he needs it (rare indicated use).     Upper respiratory symptoms  Notes he is getting over what he thinks is a bacterial infection that started as common cold.   Symptoms improving   Did an at home COVID-19 test that was negative.  Confirms having a cough.     Hyperlipidemia  States he normally has elevated LDL.   His lipid panel has improved from 1 year ago.   Subjective      Review of Systems:   Review of Systems   Constitutional: Positive for activity change (decreased exercise, intentional ). Negative for fatigue.        -17lbs since last visit in September    HENT: Positive for congestion. Negative for ear pain, postnasal drip, rhinorrhea, sinus pressure, sneezing and sore throat.    Eyes: Negative for visual disturbance.   Respiratory: Positive for cough. Negative for shortness of breath and wheezing.    Cardiovascular: Negative for chest pain, palpitations and leg swelling.   Endocrine: Negative for polydipsia, polyphagia and polyuria.   Neurological: Negative for dizziness, weakness, light-headedness, numbness and headache.       I have reviewed the patients family history, social history, past medical history, past surgical history and have updated it as appropriate.     Medications:     Current Outpatient Medications:   •  albuterol sulfate  (90 Base) MCG/ACT inhaler, Inhale 2 puffs Every 4 (Four) Hours As Needed (anal spasms)., Disp: 18 g, Rfl: 11  •  atorvastatin (LIPITOR) 20 MG tablet, Take 1 tablet by mouth Every Night., Disp: 30 tablet, Rfl: 2  •  ciclopirox (PENLAC) 8 % solution, Apply  topically to the appropriate area as directed Every Night., Disp: 12 mL, Rfl: 1  •  fenofibrate 160 MG tablet, Take 1 tablet by mouth Daily., Disp: 90 tablet, Rfl: 1  •  glucose blood (Accu-Chek Shyla Plus) test  strip, Use as instructed; check blood glucose 3 times per day, Disp: 100 each, Rfl: 12  •  glyburide (DIAbeta) 2.5 MG tablet, Take 1 tablet by mouth 2 (Two) Times a Day With Meals., Disp: 60 tablet, Rfl: 2  •  icosapent ethyl (Vascepa) 1 g capsule capsule, Take 2 g by mouth Daily., Disp: 60 capsule, Rfl: 2  •  lisinopril (PRINIVIL,ZESTRIL) 20 MG tablet, Take 1 tablet by mouth 2 (Two) Times a Day., Disp: 60 tablet, Rfl: 0  •  lisinopril-hydrochlorothiazide (PRINZIDE,ZESTORETIC) 20-25 MG per tablet, Take 1 tablet by mouth Daily., Disp: 30 tablet, Rfl: 2  •  metFORMIN ER (Glucophage XR) 500 MG 24 hr tablet, Take 2 tabs daily with breakfast and dinner, Disp: 120 tablet, Rfl: 2  •  Semaglutide,0.25 or 0.5MG/DOS, (Ozempic, 0.25 or 0.5 MG/DOSE,) 2 MG/1.5ML solution pen-injector, Inject 0.5 mg under the skin into the appropriate area as directed 1 (One) Time Per Week., Disp: 4 pen, Rfl: 1  •  sildenafil (REVATIO) 20 MG tablet, Take 5 tablets by mouth Daily As Needed (ED)., Disp: 90 tablet, Rfl: 2  •  terazosin (HYTRIN) 2 MG capsule, Take 1 tab bid, Disp: 60 capsule, Rfl: 2  •  Testosterone 200 MG pellet, by Implant route., Disp: , Rfl:   •  amLODIPine (NORVASC) 10 MG tablet, Take 1 tablet by mouth Daily., Disp: 90 tablet, Rfl: 1  •  benzonatate (Tessalon Perles) 100 MG capsule, Take 1 capsule by mouth 3 (Three) Times a Day As Needed for Cough., Disp: 21 capsule, Rfl: 0  •  empagliflozin (JARDIANCE) 10 MG tablet tablet, Take 1 tablet by mouth Daily., Disp: 90 tablet, Rfl: 1    Allergies:   No Known Allergies    Objective     Physical Exam: Please see above  Vital Signs:   Vitals:    12/17/22 1029 12/17/22 1135   BP: 146/82 142/80   Pulse: 84    Resp: 16    Temp: 98.2 °F (36.8 °C)    SpO2: 95%    Weight: 128 kg (283 lb)    Height: 172.7 cm (68\")    PainSc: 0-No pain      Body mass index is 43.03 kg/m².    Physical Exam  Vitals and nursing note reviewed.   Constitutional:       General: He is not in acute distress.      Appearance: Normal appearance. He is obese. He is not ill-appearing (pt sounds congested/ nasally ).   HENT:      Head: Normocephalic and atraumatic.      Right Ear: Tympanic membrane, ear canal and external ear normal.      Left Ear: Tympanic membrane, ear canal and external ear normal.      Nose: Nose normal.      Right Sinus: No maxillary sinus tenderness or frontal sinus tenderness.      Left Sinus: No maxillary sinus tenderness or frontal sinus tenderness.      Mouth/Throat:      Mouth: Mucous membranes are moist.      Pharynx: Oropharynx is clear. Posterior oropharyngeal erythema present. No oropharyngeal exudate.   Eyes:      General:         Right eye: No discharge.         Left eye: No discharge.      Conjunctiva/sclera: Conjunctivae normal.   Cardiovascular:      Rate and Rhythm: Normal rate and regular rhythm.      Heart sounds: Normal heart sounds.   Pulmonary:      Effort: Pulmonary effort is normal. No respiratory distress.      Breath sounds: Normal breath sounds. No stridor. No wheezing, rhonchi or rales.   Musculoskeletal:      Cervical back: Neck supple.      Right lower leg: No edema.      Left lower leg: No edema.   Feet:      Comments: Left great toe nail thick and white  Lymphadenopathy:      Cervical: No cervical adenopathy.   Skin:     General: Skin is warm and dry.      Capillary Refill: Capillary refill takes less than 2 seconds.   Neurological:      General: No focal deficit present.      Mental Status: He is alert and oriented to person, place, and time. Mental status is at baseline.      Motor: No weakness.      Gait: Gait normal.         Procedures    Results:   Imaging:     Labs:       Assessment / Plan      Assessment/Plan:   Diagnoses and all orders for this visit:    1. Type 2 diabetes mellitus without complication, without long-term current use of insulin ([facility]) (Primary)  -     POC Glycosylated Hemoglobin (Hb A1C) 7.2% (prev 10.7)        -     He was advised to check his blood  glucose at home and keep a log of his readings.         -     He will be prescribed Jardiance.         -     He will follow up in 3 months and his A1c will be checked at that time.      2. Upper respiratory symptoms       -     He was advised to use Flonase nasal spray, and to use an over the counter antihistamine such as Zyrtec, Claritin or Benadryl.       -     He was advised to use Mucinex.       -     I will prescribe a cough medication to take as needed.  -encouraged fluid and rest     3. Hypertension       -    He was advised to take his blood pressure readings daily and keep log of HR/BP. His amlodipine will be increased from 5 mg to 10 mg. I would prefer to recheck in 1m but pt again requests to only f/u in the office q6m.     4. Mixed hyperlipidemia       5. Health Maintenance   -Advised that while lifestyle modifications such as diet and exercise are an important part of the treatment plan for HLD, HTN, and DM and I do highly recommend compliance, I do not make alter pharmacologic recommendations based on this. Specifically, all medications should be taken as prescribed to assure safety. We will not be changing glyburide to accomodate glucose spikes with consumption of heavy meals. Not only is this not how the medication was studied/ guideline instructions, but it dramatically increases the chances of hyperglycemia so if doing so, stop. The only diabetic med that is prescribed on a sliding scale based on glucose reading is insulin which he cannot use given occupation as .  -Pt again requests to only come into the office for f/u visits on 6m basis. Advised that we are not there yet but once DM and HTN are better controlled we can consider q6m f/u that will be more convenient to his travelling work schedule. Given one med dose modification and one new med this visit, 4w follow up is preferred. He is agreeable to keep me informed of BP and glucose readings and will notify of concerns and come black  office to address, otherwise will plan to  follow up in 3m. Encouraged that compliance with lifestyle aspects of tx plan will help him reach these goals sooner  -see [employer] message for further recommendations   -urine microalbumin, diabetic eye exam, diabetic foot exam, HCV, and immunizations not addressed due to running out of time. I spent >30 minutes during this visit face to face with the pt and will plan for longer apt time on return     Follow Up:   Return in about 3 months (around 3/17/2023) for Recheck.  -HTN, DM2      PHILIPPE Sylvester  [facility]     Transcribed from ambient dictation for PHILIPPE Lopes by Mikayla Huizar.  12/19/22   08:37 EST    Patient or patient representative verbalized consent to the visit recording.  I have personally performed the services described in this document as transcribed by the above individual, and it is both accurate and complete.

## 2022-12-19 ENCOUNTER — PATIENT MESSAGE (OUTPATIENT)
Dept: INTERNAL MEDICINE | Facility: CLINIC | Age: 57
End: 2022-12-19

## 2022-12-19 ENCOUNTER — TELEPHONE (OUTPATIENT)
Dept: SLEEP MEDICINE | Facility: CLINIC | Age: 57
End: 2022-12-19

## 2022-12-19 DIAGNOSIS — N52.8 OTHER MALE ERECTILE DYSFUNCTION: ICD-10-CM

## 2022-12-19 DIAGNOSIS — I10 ESSENTIAL HYPERTENSION: ICD-10-CM

## 2022-12-19 DIAGNOSIS — E11.9 TYPE 2 DIABETES MELLITUS WITHOUT COMPLICATION, WITHOUT LONG-TERM CURRENT USE OF INSULIN: ICD-10-CM

## 2022-12-19 DIAGNOSIS — E78.2 MIXED HYPERLIPIDEMIA: ICD-10-CM

## 2022-12-19 DIAGNOSIS — B35.1 ONYCHOMYCOSIS: ICD-10-CM

## 2022-12-19 DIAGNOSIS — R05.1 ACUTE COUGH: ICD-10-CM

## 2022-12-19 NOTE — TELEPHONE ENCOUNTER
Caller: Harpreet Calvillo    Relationship to patient: Self    Best call back number: [phone]    Patient is needing: PT REQUESTED TO HAVE ORDERS FAXED TO [facility] AND MAILED TO HIM PT DID NOT RECEIVE HIS COPY OF THE ORDERS.   PT HAS REQUESTED TO SPEAK WITH [facility]

## 2022-12-20 RX ORDER — LISINOPRIL 20 MG/1
20 TABLET ORAL 2 TIMES DAILY
Qty: 180 TABLET | Refills: 1 | Status: SHIPPED | OUTPATIENT
Start: 2022-12-20 | End: 2023-01-09 | Stop reason: DRUGHIGH

## 2022-12-20 RX ORDER — BENZONATATE 100 MG/1
100 CAPSULE ORAL 3 TIMES DAILY PRN
Qty: 21 CAPSULE | Refills: 0 | Status: SHIPPED | OUTPATIENT
Start: 2022-12-20

## 2022-12-20 RX ORDER — SILDENAFIL CITRATE 20 MG/1
100 TABLET ORAL DAILY PRN
Qty: 90 TABLET | Refills: 2 | Status: SHIPPED | OUTPATIENT
Start: 2022-12-20

## 2022-12-20 RX ORDER — ICOSAPENT ETHYL 1000 MG/1
2 CAPSULE ORAL DAILY
Qty: 180 CAPSULE | Refills: 1 | Status: SHIPPED | OUTPATIENT
Start: 2022-12-20

## 2022-12-20 RX ORDER — TERAZOSIN 2 MG/1
CAPSULE ORAL
Qty: 180 CAPSULE | Refills: 1 | Status: SHIPPED | OUTPATIENT
Start: 2022-12-20

## 2022-12-20 RX ORDER — METFORMIN HYDROCHLORIDE 500 MG/1
TABLET, EXTENDED RELEASE ORAL
Qty: 120 TABLET | Refills: 2 | Status: SHIPPED | OUTPATIENT
Start: 2022-12-20 | End: 2023-01-09

## 2022-12-20 RX ORDER — GLYBURIDE 2.5 MG/1
2.5 TABLET ORAL 2 TIMES DAILY WITH MEALS
Qty: 180 TABLET | Refills: 1 | Status: SHIPPED | OUTPATIENT
Start: 2022-12-20 | End: 2023-01-09 | Stop reason: DRUGHIGH

## 2022-12-20 RX ORDER — ATORVASTATIN CALCIUM 20 MG/1
20 TABLET, FILM COATED ORAL NIGHTLY
Qty: 90 TABLET | Refills: 1 | Status: SHIPPED | OUTPATIENT
Start: 2022-12-20

## 2022-12-20 RX ORDER — LISINOPRIL AND HYDROCHLOROTHIAZIDE 25; 20 MG/1; MG/1
1 TABLET ORAL DAILY
Qty: 90 TABLET | Refills: 1 | Status: SHIPPED | OUTPATIENT
Start: 2022-12-20

## 2022-12-20 NOTE — TELEPHONE ENCOUNTER
From: Harpreet Calvillo  To: PHILIPPE Lopes  Sent: 12/19/2022 8:38 PM EST  Subject: Rxs    Only 4 of the medications I take were received by [facility] on [address]    I'm needing:  Metformin XR 4x a day  Glyburide x 4 per day  Atorvastatin x1 per day  Lisinopril HCTZ x1 per day  Lisinopril 20 MG x1 per day  Terazosin x1 per day  Vascepa x 4 per day  Sildenafil as it was prescribed in the past  Ciclopirox     Cough Syrup?  Something to relieve nasal congestion.     Am I doing something wrong for them not to have received this? Was I supposed to Pickup a paper Rx?    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

## 2023-01-09 ENCOUNTER — TELEPHONE (OUTPATIENT)
Dept: INTERNAL MEDICINE | Facility: CLINIC | Age: 58
End: 2023-01-09
Payer: COMMERCIAL

## 2023-01-09 DIAGNOSIS — E78.2 MIXED HYPERLIPIDEMIA: ICD-10-CM

## 2023-01-09 DIAGNOSIS — E11.9 TYPE 2 DIABETES MELLITUS WITHOUT COMPLICATION, WITHOUT LONG-TERM CURRENT USE OF INSULIN: ICD-10-CM

## 2023-01-09 DIAGNOSIS — I10 ESSENTIAL HYPERTENSION: Primary | ICD-10-CM

## 2023-01-09 RX ORDER — GLYBURIDE 5 MG/1
5 TABLET ORAL 2 TIMES DAILY WITH MEALS
Qty: 180 TABLET | Refills: 1 | Status: SHIPPED | OUTPATIENT
Start: 2023-01-09

## 2023-01-09 RX ORDER — LISINOPRIL 20 MG/1
20 TABLET ORAL DAILY
Qty: 90 TABLET | Refills: 1 | Status: SHIPPED | OUTPATIENT
Start: 2023-01-09

## 2023-01-09 RX ORDER — FENOFIBRATE 160 MG/1
160 TABLET ORAL DAILY
Qty: 90 TABLET | Refills: 1 | Status: SHIPPED | OUTPATIENT
Start: 2023-01-09

## 2023-01-09 RX ORDER — METFORMIN HYDROCHLORIDE EXTENDED-RELEASE TABLETS 1000 MG/1
1000 TABLET, FILM COATED, EXTENDED RELEASE ORAL 2 TIMES DAILY WITH MEALS
Qty: 180 TABLET | Refills: 1 | Status: SHIPPED | OUTPATIENT
Start: 2023-01-09 | End: 2023-01-13 | Stop reason: ALTCHOICE

## 2023-01-10 ENCOUNTER — TELEPHONE (OUTPATIENT)
Dept: INTERNAL MEDICINE | Facility: CLINIC | Age: 58
End: 2023-01-10
Payer: COMMERCIAL

## 2023-01-10 NOTE — TELEPHONE ENCOUNTER
Fax from [facility], wanting alternative to the metformin, rx sent yesterday not covered under insurance.

## 2023-01-13 DIAGNOSIS — E11.9 TYPE 2 DIABETES MELLITUS WITHOUT COMPLICATION, WITHOUT LONG-TERM CURRENT USE OF INSULIN: Primary | ICD-10-CM

## 2023-03-19 DIAGNOSIS — E11.9 TYPE 2 DIABETES MELLITUS WITHOUT COMPLICATION, WITHOUT LONG-TERM CURRENT USE OF INSULIN: ICD-10-CM

## 2023-03-19 RX ORDER — METFORMIN HYDROCHLORIDE 500 MG/1
TABLET, EXTENDED RELEASE ORAL
Qty: 360 TABLET | OUTPATIENT
Start: 2023-03-19

## 2023-08-07 DIAGNOSIS — E11.9 TYPE 2 DIABETES MELLITUS WITHOUT COMPLICATION, WITHOUT LONG-TERM CURRENT USE OF INSULIN: ICD-10-CM

## 2023-08-07 NOTE — TELEPHONE ENCOUNTER
"Caller: Harpreet Calvillo \"Cristobal\"    Relationship: Self    Best call back number: [phone]     Requested Prescriptions:   Requested Prescriptions     Pending Prescriptions Disp Refills    empagliflozin (Jardiance) 25 MG tablet tablet 30 tablet 2     Sig: Take 1 tablet by mouth Daily.        Pharmacy where request should be sent: [facility][id number] - [place], [place] - [address] - [phone]  - [phone] FX     Last office visit with prescribing clinician: 7/1/2023   Last telemedicine visit with prescribing clinician: Visit date not found   Next office visit with prescribing clinician: 1/13/2024     Additional details provided by patient: PATIENT IS REQUESTING A 90 DAY SUPPLY FOR THE ABOVE MEDICATION DUE TO IT BEING A LESSER EXPENSE PATIENT WOULD LIKE A CALLBACK FOR DETAIL INFORMATION    Does the patient have less than a 3 day supply:  [x] Yes  [] No    Would you like a call back once the refill request has been completed: [x] Yes [] No    If the office needs to give you a call back, can they leave a voicemail: [x] Yes [] No    Romario Cardona Rep   08/07/23 11:57 EDT         "

## 2023-10-02 DIAGNOSIS — E11.9 TYPE 2 DIABETES MELLITUS WITHOUT COMPLICATION, WITHOUT LONG-TERM CURRENT USE OF INSULIN: ICD-10-CM

## 2023-10-03 RX ORDER — EMPAGLIFLOZIN 10 MG/1
TABLET, FILM COATED ORAL
Qty: 90 TABLET | Refills: 1 | OUTPATIENT
Start: 2023-10-03

## 2023-10-30 NOTE — PROGRESS NOTES
Sleep Clinic Video Visit Follow Up Note    You have chosen to receive care through a telehealth visit.  Do you consent to use a video connection for your medical care today? Yes       Chief Complaint  Follow-up and compliance of PAP therapy    Subjective     History of Present Illness (from previous encounter on 11/3/2022 with Ms. Rhodes):  Harpreet Calvillo is a 56 y.o.male here today for initial evaluation of his sleeping problem.  He states he has been on a CPAP machine for many years.  He had been following with [facility] but states that he wanted to establish care with our office.     He recently received a new Bipap machine due to the recall.  He currently uses a Mirage Quatro medium facemask.  He does wear his Bipap every night and does benefit from CPAP therapy.  He denies any sleeping difficulties.  He denies any daytime somnolence or fatigue.  He has never had to use medication aids to help him fall asleep.     He states his original sleep study did show an AHI of 90 but does not recall when the study was performed.  He has had his machine for at least 15 years.     He states his hours do very but he does try to get an average of 6-1/2 hours every night of sleep.  He is a  and does stay compliant with his BiPAP due to this.     He denies chest pain or palpitations.  He denies any lower extremity edema or calf tenderness.     He does try to limit his caffeine use.  He typically drinks 1 cup of coffee in the morning and 1 to 2 cans of diet cola in the afternoons.     He denies any seasonal allergies.  He does have a history of diabetes and high blood pressure.  He denies any deviated septum or sinus surgeries.     His [place] Sleepiness Scale is 7/24  (End copied text)    Interval History:  Harpreet Calvillo is a 57 y.o. male returns for follow up and compliance of PAP therapy. The patient was last seen on 11/3/2022 with Ms. Rhodes. Overall the patient feels good with regard to therapy. The  device appears to be working appropriately. On average the patient sleeps at least 6 hours per night. The patient wake 1 times per night. He feels much improved and is no longer having difficulty with falling asleep at lights.       The patient reports the following changes to their medical and medication history since they were last seen:  Started Jardiance, and continuous glucose sensor    Further details are as follows:    [place] Scale is: 3/24      Weight:  Current Weight: 287 lb      The patient's relevant past medical, surgical, family, and social history reviewed and updated in Epic as appropriate.    PMH:    Past Medical History:   Diagnosis Date    Diabetes mellitus     ED (erectile dysfunction)     Hyperlipidemia     Hypertension     Rectal spasm     Sleep apnea      Past Surgical History:   Procedure Laterality Date    COLONOSCOPY      HERNIA REPAIR      SHOULDER ACROMIOCLAVICULAR JOINT REPAIR  2016       No Known Allergies    MEDS:  Prior to Admission medications    Medication Sig Start Date End Date Taking? Authorizing Provider   albuterol sulfate  (90 Base) MCG/ACT inhaler Inhale 2 puffs Every 4 (Four) Hours As Needed (anal spasms). 7/1/23   Diana Brooks APRN   amLODIPine (NORVASC) 10 MG tablet Take 1 tablet by mouth Daily. 7/1/23   Diana Brooks APRN   atorvastatin (LIPITOR) 20 MG tablet Take 1 tablet by mouth Every Night. 7/1/23   Diana Brooks APRN   ciclopirox (PENLAC) 8 % solution Apply  topically to the appropriate area as directed Every Night. 7/6/23   Diana Brooks APRN   Continuous Blood Gluc  (FreeStyle Emmie 14 Day [place]) device 1 each Continuous. Use to check blood glucose daily 7/5/23   Diana Brooks APRN   Continuous Blood Gluc  device 1 each Continuous. For use with FreeStyle Emmie 3 Sensor 7/14/23   Diana Brooks APRN   Continuous Blood Gluc Sensor (FreeStyle Emmie 3 Sensor) misc 12 each Every 14 (Fourteen)  "Days. 7/14/23   Diana Brooks APRN   Continuous Blood Gluc Sensor (FreeStyle Emmie Sensor System) Every 14 (Fourteen) Days. 7/5/23   Diana Brooks APRN   empagliflozin (Jardiance) 25 MG tablet tablet Take 1 tablet by mouth Daily. 8/7/23   Diana Brooks APRN   fenofibrate 160 MG tablet Take 1 tablet by mouth Daily. 7/1/23   Diana Brooks APRN   glucose blood (Accu-Chek Shyla Plus) test strip Use as instructed; check blood glucose 3 times per day 3/4/22   Janki Baldwin APRN   glyburide (DIAbeta) 2.5 MG tablet Take 2 tablets by mouth 2 (Two) Times a Day With Meals. 7/1/23   Diana Brooks APRN   glyburide (DIAbeta) 5 MG tablet Take 1 tablet by mouth 2 (Two) Times a Day With Meals. 1/9/23   Diana Brooks APRN   icosapent ethyl (Vascepa) 1 g capsule capsule Take 2 g by mouth Daily. 7/1/23   Diana Brooks APRN   lisinopril (PRINIVIL,ZESTRIL) 20 MG tablet Take 1 tablet by mouth Daily. 7/1/23   Diana Brooks APRN   lisinopril-hydrochlorothiazide (PRINZIDE,ZESTORETIC) 20-25 MG per tablet Take 1 tablet by mouth Daily. 7/1/23   Diana Brooks APRN   metFORMIN ER (GLUCOPHAGE-XR) 500 MG 24 hr tablet Take 4 tablets by mouth Daily With Breakfast. 7/6/23   Diana Brooks APRN   nystatin-triamcinolone (MYCOLOG) 873250-9.1 UNIT/GM-% ointment Apply 1 application topically to the appropriate area as directed 2 (Two) Times a Day. 7/1/23   Diana Brooks APRN   sildenafil (REVATIO) 20 MG tablet Take 5 tablets by mouth Daily As Needed (ED). 7/6/23   Diana Brooks APRN   terazosin (HYTRIN) 2 MG capsule Take 1 tab bid 7/1/23   Diana Brooks APRN   Testosterone 200 MG pellet by Implant route.    Provider, Historical, MD         FH:  Family History   Adopted: Yes   Family history unknown: Yes       Objective   Vital Signs:  Ht 172.7 cm (68\")   Wt 130 kg (287 lb)   BMI 43.64 kg/m²     Class 3 Severe Obesity (BMI >=40). " Obesity-related health conditions include the following: obstructive sleep apnea.           Physical Exam  Constitutional:       Appearance: Normal appearance.   Neurological:      Mental Status: He is alert and oriented to person, place, and time.   Psychiatric:         Mood and Affect: Mood normal.         Thought Content: Thought content normal.         Judgment: Judgment normal.               Result Review :           PAP Report:  AHI: 0.3/h  Days of Usage: 90/90 (100%)  95th percentile leaks: 13.0 L/min  Number of Days Greater than 4 hours: 87/90 (97%)  Settings: V auto-Max IPAP 21.6 cm H2O, min EPAP 17.6 cm H2O, pressure support 3 cm H2O.       Assessment and Plan  Harpreet Calvillo is a 57 y.o. male who returns for follow-up and compliance of PAP therapy.  The pap report has been reviewed.  Overall usage is at 100% with compliance at 97%.  Sleep apnea is well controlled with an AHI of 0.3/H.  Patient averages 6 hours 41 minutes.    I will refill the patient's supplies, and they will return for follow-up and compliance in 1 year or sooner should they have further questions or concerns.      Diagnoses and all orders for this visit:    1. EDUARDO (obstructive sleep apnea) (Primary)  -     PAP Therapy    2. Morbid (severe) obesity due to excess calories             The patient continues to use and benefit from PAP therapy.    1. The patient was counseled regarding multimodal approach with healthy nutrition, healthy sleep, regular physical activity, social activities, counseling, and medications. Encouraged to practice lateral sleep position. Avoid alcohol and sedatives close to bedtime.     2.  We will refill supplies x1 year.  Return to clinic 1 year or sooner if symptoms warrant.  Patient gave verbal consent today for video visit. I have reviewed the results of my evaluation and impression and discussed my recommendations in detail with the patient.         Follow Up  Return in about 1 year (around 11/1/2024) for  Annual visit, Video visit.  Patient was given instructions and counseling regarding his condition or for health maintenance advice. Please see specific information pulled into the AVS if appropriate.       PHILIPPE Currie, ACNP-BC  Pulmonology, Critical Care, and Sleep Medicine

## 2023-11-01 ENCOUNTER — TELEMEDICINE (OUTPATIENT)
Dept: SLEEP MEDICINE | Facility: HOSPITAL | Age: 58
End: 2023-11-01
Payer: COMMERCIAL

## 2023-11-01 VITALS — HEIGHT: 68 IN | WEIGHT: 287 LBS | BODY MASS INDEX: 43.5 KG/M2

## 2023-11-01 DIAGNOSIS — E66.01 MORBID (SEVERE) OBESITY DUE TO EXCESS CALORIES: ICD-10-CM

## 2023-11-01 DIAGNOSIS — G47.33 OSA (OBSTRUCTIVE SLEEP APNEA): Primary | ICD-10-CM

## 2023-11-05 DIAGNOSIS — E11.9 TYPE 2 DIABETES MELLITUS WITHOUT COMPLICATION, WITHOUT LONG-TERM CURRENT USE OF INSULIN: ICD-10-CM

## 2023-12-17 DIAGNOSIS — I10 ESSENTIAL HYPERTENSION: ICD-10-CM

## 2023-12-17 DIAGNOSIS — E78.2 MIXED HYPERLIPIDEMIA: ICD-10-CM

## 2023-12-17 DIAGNOSIS — I10 HYPERTENSION, UNSPECIFIED TYPE: ICD-10-CM

## 2023-12-17 DIAGNOSIS — E11.9 TYPE 2 DIABETES MELLITUS WITHOUT COMPLICATION, WITHOUT LONG-TERM CURRENT USE OF INSULIN: ICD-10-CM

## 2023-12-20 DIAGNOSIS — I10 ESSENTIAL HYPERTENSION: ICD-10-CM

## 2023-12-20 RX ORDER — METFORMIN HYDROCHLORIDE 500 MG/1
2000 TABLET, EXTENDED RELEASE ORAL
Qty: 360 TABLET | Refills: 0 | OUTPATIENT
Start: 2023-12-20

## 2023-12-20 RX ORDER — TERAZOSIN 2 MG/1
2 CAPSULE ORAL EVERY 12 HOURS SCHEDULED
Qty: 180 CAPSULE | Refills: 0 | Status: SHIPPED | OUTPATIENT
Start: 2023-12-20

## 2023-12-20 RX ORDER — AMLODIPINE BESYLATE 10 MG/1
10 TABLET ORAL DAILY
Qty: 90 TABLET | Refills: 0 | Status: SHIPPED | OUTPATIENT
Start: 2023-12-20 | End: 2023-12-20 | Stop reason: SDUPTHER

## 2023-12-20 RX ORDER — AMLODIPINE BESYLATE 10 MG/1
10 TABLET ORAL DAILY
Qty: 90 TABLET | Refills: 0 | Status: SHIPPED | OUTPATIENT
Start: 2023-12-20

## 2023-12-20 RX ORDER — METFORMIN HYDROCHLORIDE 500 MG/1
2000 TABLET, EXTENDED RELEASE ORAL
Qty: 360 TABLET | Refills: 0 | Status: SHIPPED | OUTPATIENT
Start: 2023-12-20

## 2023-12-20 RX ORDER — LISINOPRIL AND HYDROCHLOROTHIAZIDE 25; 20 MG/1; MG/1
1 TABLET ORAL DAILY
Qty: 90 TABLET | Refills: 0 | Status: SHIPPED | OUTPATIENT
Start: 2023-12-20

## 2023-12-20 RX ORDER — ICOSAPENT ETHYL 1000 MG/1
2 CAPSULE ORAL DAILY
Qty: 180 CAPSULE | Refills: 0 | Status: SHIPPED | OUTPATIENT
Start: 2023-12-20

## 2023-12-20 RX ORDER — ATORVASTATIN CALCIUM 20 MG/1
20 TABLET, FILM COATED ORAL NIGHTLY
Qty: 90 TABLET | Refills: 0 | Status: SHIPPED | OUTPATIENT
Start: 2023-12-20

## 2023-12-20 RX ORDER — LISINOPRIL 20 MG/1
20 TABLET ORAL DAILY
Qty: 90 TABLET | Refills: 0 | Status: SHIPPED | OUTPATIENT
Start: 2023-12-20

## 2023-12-20 RX ORDER — GLYBURIDE 5 MG/1
5 TABLET ORAL 2 TIMES DAILY WITH MEALS
Qty: 180 TABLET | Refills: 1 | Status: SHIPPED | OUTPATIENT
Start: 2023-12-20

## 2023-12-20 NOTE — TELEPHONE ENCOUNTER
HUB to relay message, please give message below.    Per Todd:  Please advise that I have sent a 3-month supply of all requested medications to [facility] pharmacy.  It is patient's responsibility to call and reschedule/ attend appointment before Rx's run out. No further medications will be authorized. Pt cancelled appt and will need to check labs. I suggest he schedule with new provider to establish care.

## 2023-12-20 NOTE — TELEPHONE ENCOUNTER
Patient last seen in July, at which time I recommended preferred 1 month follow-up, 3m at latest given uncontrolled diabetes and hypertension.  Due to his work schedule and availability as a , we agreed for him to follow-up in 6 months.  On return patient was to be scheduled for an annual physical with lab work.  Please see notes from the last office visit that telehealth not ok because of need to recheck A1c and blood pressure reading.  Patient was counseled that if A1c increases to >9, he will require insulin.  With this refill request I see that he has canceled his upcoming follow-up appointment with me. Please advise that I have sent a 3-month supply of all requested medications to [facility] pharmacy.  Do not send any additional refill request to me.  It is patient's responsibility to call and reschedule/ attend appointment before Rx's run out. No further medications will be authorized. I suggest he schedule with new provider to establish care.

## 2024-01-13 ENCOUNTER — PATIENT MESSAGE (OUTPATIENT)
Dept: INTERNAL MEDICINE | Facility: CLINIC | Age: 59
End: 2024-01-13

## 2024-01-13 ENCOUNTER — OFFICE VISIT (OUTPATIENT)
Dept: INTERNAL MEDICINE | Facility: CLINIC | Age: 59
End: 2024-01-13
Payer: COMMERCIAL

## 2024-01-13 VITALS
HEART RATE: 88 BPM | WEIGHT: 290 LBS | TEMPERATURE: 97.8 F | BODY MASS INDEX: 43.95 KG/M2 | RESPIRATION RATE: 18 BRPM | DIASTOLIC BLOOD PRESSURE: 68 MMHG | HEIGHT: 68 IN | OXYGEN SATURATION: 95 % | SYSTOLIC BLOOD PRESSURE: 118 MMHG

## 2024-01-13 DIAGNOSIS — E78.2 MIXED HYPERLIPIDEMIA: ICD-10-CM

## 2024-01-13 DIAGNOSIS — Z12.5 SCREENING PSA (PROSTATE SPECIFIC ANTIGEN): ICD-10-CM

## 2024-01-13 DIAGNOSIS — G47.33 OSA (OBSTRUCTIVE SLEEP APNEA): ICD-10-CM

## 2024-01-13 DIAGNOSIS — E78.1 HYPERTRIGLYCERIDEMIA: ICD-10-CM

## 2024-01-13 DIAGNOSIS — Z00.00 HEALTHCARE MAINTENANCE: ICD-10-CM

## 2024-01-13 DIAGNOSIS — R05.1 ACUTE COUGH: ICD-10-CM

## 2024-01-13 DIAGNOSIS — I10 ESSENTIAL HYPERTENSION: ICD-10-CM

## 2024-01-13 DIAGNOSIS — E11.9 TYPE 2 DIABETES MELLITUS WITHOUT COMPLICATION, WITHOUT LONG-TERM CURRENT USE OF INSULIN: Primary | ICD-10-CM

## 2024-01-13 LAB
EXPIRATION DATE: ABNORMAL
HBA1C MFR BLD: 6.2 % (ref 4.5–5.7)
Lab: ABNORMAL

## 2024-01-13 RX ORDER — AMLODIPINE BESYLATE 10 MG/1
10 TABLET ORAL DAILY
Qty: 90 TABLET | Refills: 1 | Status: SHIPPED | OUTPATIENT
Start: 2024-01-13

## 2024-01-13 RX ORDER — LISINOPRIL AND HYDROCHLOROTHIAZIDE 25; 20 MG/1; MG/1
1 TABLET ORAL DAILY
Qty: 90 TABLET | Refills: 1 | Status: SHIPPED | OUTPATIENT
Start: 2024-01-13

## 2024-01-13 RX ORDER — ALBUTEROL SULFATE 90 UG/1
2 AEROSOL, METERED RESPIRATORY (INHALATION) EVERY 4 HOURS PRN
Qty: 18 G | Refills: 11 | Status: SHIPPED | OUTPATIENT
Start: 2024-01-13

## 2024-01-13 RX ORDER — TERAZOSIN 2 MG/1
2 CAPSULE ORAL EVERY 12 HOURS SCHEDULED
Qty: 180 CAPSULE | Refills: 1 | Status: SHIPPED | OUTPATIENT
Start: 2024-01-13

## 2024-01-13 RX ORDER — ICOSAPENT ETHYL 1000 MG/1
2 CAPSULE ORAL DAILY
Qty: 180 CAPSULE | Refills: 1 | Status: SHIPPED | OUTPATIENT
Start: 2024-01-13

## 2024-01-13 RX ORDER — METFORMIN HYDROCHLORIDE 500 MG/1
2000 TABLET, EXTENDED RELEASE ORAL
Qty: 360 TABLET | Refills: 1 | Status: SHIPPED | OUTPATIENT
Start: 2024-01-13

## 2024-01-13 RX ORDER — GLYBURIDE 2.5 MG/1
5 TABLET ORAL 2 TIMES DAILY WITH MEALS
Qty: 180 TABLET | Refills: 1 | Status: SHIPPED | OUTPATIENT
Start: 2024-01-13

## 2024-01-13 RX ORDER — ATORVASTATIN CALCIUM 20 MG/1
20 TABLET, FILM COATED ORAL NIGHTLY
Qty: 90 TABLET | Refills: 1 | Status: SHIPPED | OUTPATIENT
Start: 2024-01-13

## 2024-01-13 RX ORDER — LISINOPRIL 20 MG/1
20 TABLET ORAL DAILY
Qty: 90 TABLET | Refills: 1 | Status: SHIPPED | OUTPATIENT
Start: 2024-01-13

## 2024-01-13 NOTE — PROGRESS NOTES
Follow Up Office Visit      Date: 2024   Patient Name: Harpreet Calvillo  : 1965   MRN: [id number]     Chief Complaint:    Chief Complaint   Patient presents with    Diabetes       History of Present Illness: Harpreet Calvillo is a 58 y.o. male who is here today to follow up of chronic conditions. He is overdue for f/u. Pt works as a . He was informed our office will no longer have Saturday apts starting in March. This is his only availability, so he states will need to find another office/ provider. Overall, has been doing well since his last visit in 2023 and has no acute complaints at this time.     DM2  - A1C today is 6.2% (prev 8.5)  -Has CGM and very closely monitors his BG readings   -current tx: metformin ER 2000mg qAM, glyburide 5mg BID, jardiance 25mg. Jardiance dose was increased at last visit.  -Pt requests that the glyburide go back to 2.5mg tablets, bc he will occasionally only take 2.5mg each dose instead of 5mg  -Reports that he has also been taking 0.50 mg ozempic once weekly. This was a leftover Rx from when he was previously prescribed this med. Has only been doing this a week or two. Does not have any more doses left of  this med.  In the past, he told me while using this med he experienced diabetic retinopathy.   -last diabetic eye exam was about 1 yr ago  -overdue foot exam     HTN  -Does monitor his bp regularly at home, noting that it has been consistently at goal   -Current tx-- In the AM: lisinopril/ hctz 20-15mg, amlodipine 10mg, and terazosin 2mg. In the PM: lisinopril 20mg and terazosin 2mg.     HLD/ hypertriglyceridemia   -last FLP 2022  -current tx: lipitor 20qd, fenofibrate 160mg qd, and vascepa 2g qd  -compliant with statin  -Only taking vascepa at this time. States he has been on vascepa for about 1.5m now. Not taking fenofibrate, does have Rx at home.      E.D  -Sildenafil PRN works well. Has Rx at home. Does not need RF at this time.     Cough/  Proctalgia fugax  -Requires rare use of albuterol PRN, does request RF today    EDUARDO  -compliant with BiPAP  -follows with sleep medicine, now at [facility]. [facility]    Subjective      Review of Systems:   Review of Systems   Constitutional:  Negative for activity change, fatigue, unexpected weight gain and unexpected weight loss.   HENT: Negative.     Eyes:  Negative for blurred vision, double vision and visual disturbance.   Respiratory:  Negative for cough, chest tightness and shortness of breath.    Cardiovascular:  Negative for chest pain, palpitations and leg swelling.   Gastrointestinal: Negative.    Endocrine: Negative for polydipsia, polyphagia and polyuria.   Genitourinary: Negative.    Musculoskeletal:  Negative for arthralgias and myalgias.   Skin:  Negative for color change, pallor and rash.   Neurological:  Negative for dizziness, weakness, light-headedness, numbness and headache.   Hematological: Negative.    Psychiatric/Behavioral: Negative.         I have reviewed the patients family history, social history, past medical history, past surgical history and have updated it as appropriate.     Medications:     Current Outpatient Medications:     albuterol sulfate  (90 Base) MCG/ACT inhaler, Inhale 2 puffs Every 4 (Four) Hours As Needed (anal spasms)., Disp: 18 g, Rfl: 11    amLODIPine (NORVASC) 10 MG tablet, Take 1 tablet by mouth Daily., Disp: 90 tablet, Rfl: 1    atorvastatin (LIPITOR) 20 MG tablet, Take 1 tablet by mouth Every Night., Disp: 90 tablet, Rfl: 1    ciclopirox (PENLAC) 8 % solution, Apply  topically to the appropriate area as directed Every Night., Disp: 12 mL, Rfl: 1    Continuous Blood Gluc  device, 1 each Continuous. For use with FreeStyle Emmie 3 Sensor, Disp: 1 each, Rfl: 0    Continuous Blood Gluc Sensor (FreeStyle Emmie 3 Sensor) misc, 12 each Every 14 (Fourteen) Days., Disp: 6 each, Rfl: 3    empagliflozin (Jardiance) 25 MG tablet tablet, Take 1 tablet by mouth  "Daily., Disp: 90 tablet, Rfl: 1    lisinopril (PRINIVIL,ZESTRIL) 20 MG tablet, Take 1 tablet by mouth Daily., Disp: 90 tablet, Rfl: 1    lisinopril-hydrochlorothiazide (PRINZIDE,ZESTORETIC) 20-25 MG per tablet, Take 1 tablet by mouth Daily., Disp: 90 tablet, Rfl: 1    metFORMIN ER (GLUCOPHAGE-XR) 500 MG 24 hr tablet, Take 4 tablets by mouth Daily With Breakfast., Disp: 360 tablet, Rfl: 1    nystatin-triamcinolone (MYCOLOG) 734293-6.1 UNIT/GM-% ointment, Apply 1 application topically to the appropriate area as directed 2 (Two) Times a Day., Disp: 30 g, Rfl: 0    sildenafil (REVATIO) 20 MG tablet, Take 5 tablets by mouth Daily As Needed (ED)., Disp: 90 tablet, Rfl: 2    terazosin (HYTRIN) 2 MG capsule, Take 1 capsule by mouth Every 12 (Twelve) Hours., Disp: 180 capsule, Rfl: 1    Vascepa 1 g capsule capsule, Take 2 g by mouth Daily., Disp: 180 capsule, Rfl: 1    fenofibrate 160 MG tablet, Take 1 tablet by mouth Daily. (Patient not taking: Reported on 1/13/2024), Disp: 90 tablet, Rfl: 1    glyburide (DIAbeta) 2.5 MG tablet, Take 2 tablets by mouth 2 (Two) Times a Day With Meals., Disp: 180 tablet, Rfl: 1    Allergies:   No Known Allergies    Objective     Physical Exam: Please see above  Vital Signs:   Vitals:    01/13/24 0930   BP: 118/68   Pulse: 88   Resp: 18   Temp: 97.8 °F (36.6 °C)   SpO2: 95%   Weight: 132 kg (290 lb)   Height: 172.7 cm (68\")   PainSc: 0-No pain     Body mass index is 44.09 kg/m².    Physical Exam  Vitals and nursing note reviewed.   Constitutional:       General: He is not in acute distress.     Appearance: Normal appearance. He is obese. He is not ill-appearing or diaphoretic.   Cardiovascular:      Rate and Rhythm: Normal rate and regular rhythm.      Heart sounds: Normal heart sounds.   Pulmonary:      Effort: Pulmonary effort is normal. No respiratory distress.      Breath sounds: Normal breath sounds.   Skin:     General: Skin is warm and dry.      Capillary Refill: Capillary refill takes " less than 2 seconds.   Neurological:      General: No focal deficit present.      Mental Status: He is alert and oriented to person, place, and time. Mental status is at baseline.   Psychiatric:         Mood and Affect: Mood normal.         Behavior: Behavior normal.         Thought Content: Thought content normal.         Judgment: Judgment normal.             Results:   Imaging:     Labs:       Latest Reference Range & Units 09/21/22 14:26   Sodium 134 - 144 mmol/L 138   Potassium 3.5 - 5.2 mmol/L 4.2   Chloride 96 - 106 mmol/L 98   CO2 20 - 29 mmol/L 21   BUN 6 - 24 mg/dL 21   Creatinine 0.76 - 1.27 mg/dL 0.91   BUN/Creatinine Ratio 9 - 20  23 (H)   EGFR Result >59 mL/min/1.73 100   Glucose 65 - 99 mg/dL 85   Calcium 8.7 - 10.2 mg/dL 9.6   Alkaline Phosphatase 44 - 121 IU/L 65   Total Protein 6.0 - 8.5 g/dL 6.8   Albumin 3.8 - 4.9 g/dL 4.8   A/G Ratio 1.2 - 2.2  2.4 (H)   AST (SGOT) 0 - 40 IU/L 33   ALT (SGPT) 0 - 44 IU/L 44   Total Bilirubin 0.0 - 1.2 mg/dL 0.5   TSH Baseline 0.450 - 4.500 uIU/mL 1.500   Total Cholesterol 100 - 199 mg/dL 131   HDL Cholesterol >39 mg/dL 38 (L)   LDL Cholesterol  0 - 99 mg/dL 57   Triglycerides 0 - 149 mg/dL 220 (H)   VLDL Cholesterol Gilberto 5 - 40 mg/dL 36   Globulin 1.5 - 4.5 g/dL 2.0   WBC 3.4 - 10.8 x10E3/uL 7.5   RBC 4.14 - 5.80 x10E6/uL 5.02   Hemoglobin 13.0 - 17.7 g/dL 14.6   Hematocrit 37.5 - 51.0 % 42.9   RDW 11.6 - 15.4 % 13.3   MCV 79 - 97 fL 86   MCH 26.6 - 33.0 pg 29.1   MCHC 31.5 - 35.7 g/dL 34.0   PSA 0.0 - 4.0 ng/mL 0.8   PSA, Free N/A ng/mL 0.32   PSA, Free % % 40.0   (H): Data is abnormally high  (L): Data is abnormally low    Assessment / Plan      Assessment/Plan:   Diagnoses and all orders for this visit:    Type 2 diabetes mellitus without complication, without long-term current use of insulin   -A1C much improved today   -continue monitoring BG levels. Has CGM, testing supplies refilled last visit  -continue current tx, no med changes at this time   -advised  that oral meds should not be adjusted based on readings. Very happy his A1C has gone down, but if he feels that he needs to adjust glyburide dose we can decrease from 5mg BID to 2.5mg BID or make other modifications to pharmacologic plan. Oral meds should not be adjusted on sliding scale like mealtime insulin would.   -instructed to call and schedule overdue diabetic eye exam. Offered refferal, but he has ophthalmologist he can schedule with   -overdue diabetic foot exam  -repeat urine microalbumin/creatinine ratio ordered. Last m/c ratio WNL 9/2022  -Need recheck in   -     POC Glycosylated Hemoglobin (Hb A1C)  -     empagliflozin (Jardiance) 25 MG tablet tablet; Take 1 tablet by mouth Daily.  Dispense: 90 tablet; Refill: 1  -     metFORMIN ER (GLUCOPHAGE-XR) 500 MG 24 hr tablet; Take 4 tablets by mouth Daily With Breakfast.  Dispense: 360 tablet; Refill: 1  -     glyburide (DIAbeta) 2.5 MG tablet; Take 2 tablets by mouth 2 (Two) Times a Day With Meals.  Dispense: 180 tablet; Refill: 1  -     Microalbumin / Creatinine Urine Ratio - Urine, Clean Catch; Future    Essential hypertension  -well controlled, continue current tx  -goal <130/80  -     amLODIPine (NORVASC) 10 MG tablet; Take 1 tablet by mouth Daily.  Dispense: 90 tablet; Refill: 1  -     lisinopril (PRINIVIL,ZESTRIL) 20 MG tablet; Take 1 tablet by mouth Daily.  Dispense: 90 tablet; Refill: 1  -     lisinopril-hydrochlorothiazide (PRINZIDE,ZESTORETIC) 20-25 MG per tablet; Take 1 tablet by mouth Daily.  Dispense: 90 tablet; Refill: 1  -     terazosin (HYTRIN) 2 MG capsule; Take 1 capsule by mouth Every 12 (Twelve) Hours.  Dispense: 180 capsule; Refill: 1  -     Microalbumin / Creatinine Urine Ratio - Urine, Clean Catch; Future    Mixed hyperlipidemia  Hypertriglyceridemia  -FLP 9/2022: TC- 131, triglycerides- 220, HDL- 38, LDL57. Improved from prior   -Repeat lipid panel ordered today, will make further recommendations after lab review. For now, continue  statin and vascepa as prescribed. He does have good quantity of fenofibrate tablets at home  -     Lipid Panel; Future  -     atorvastatin (LIPITOR) 20 MG tablet; Take 1 tablet by mouth Every Night.  Dispense: 90 tablet; Refill: 1  -     Vascepa 1 g capsule capsule; Take 2 g by mouth Daily.  Dispense: 180 capsule; Refill: 1    EDUARDO (obstructive sleep apnea)  -Continue compliance with BiPAP. F/U with sleep medicine as scheduled.     Acute cough  -     albuterol sulfate  (90 Base) MCG/ACT inhaler; Inhale 2 puffs Every 4 (Four) Hours As Needed (anal spasms).  Dispense: 18 g; Refill: 11    Screening PSA (prostate specific antigen)  -     PSA Screen; Future     Healthcare maintenance  -Lab orders placed. [facility] These orders were printed and handed to pt at time of visit. Urine order was sent to him via [employer] so this can be collected as well.   -Offered to help him find new PCP that has weekend apt availability. He will call around and let me know if any trouble finding new provider/ office.   -overdue immunizations: PCV, hep B, covid booster (declined today). Tdap due 2027. Zoster and seasonal influenza completed.   -colorectal cancer screening due 2026  -     CBC (No Diff); Future  -     Comprehensive Metabolic Panel; Future  -     Lipid Panel; Future  -     PSA Screen; Future  -     TSH Rfx On Abnormal To Free T4; Future      Follow Up:   Return in about 3 months (around 4/13/2024), or if symptoms worsen or fail to improve.    PHILIPPE Sylvester  [facility] [place]

## 2024-01-16 DIAGNOSIS — E11.9 TYPE 2 DIABETES MELLITUS WITHOUT COMPLICATION, WITHOUT LONG-TERM CURRENT USE OF INSULIN: ICD-10-CM

## 2024-01-16 RX ORDER — EMPAGLIFLOZIN 25 MG/1
25 TABLET, FILM COATED ORAL DAILY
Qty: 90 TABLET | Refills: 1 | OUTPATIENT
Start: 2024-01-16

## 2024-01-18 LAB
ALBUMIN SERPL-MCNC: 4.6 G/DL (ref 3.8–4.9)
ALBUMIN/CREAT UR: <3 MG/G CREAT (ref 0–29)
ALBUMIN/GLOB SERPL: 2.1 {RATIO} (ref 1.2–2.2)
ALP SERPL-CCNC: 70 IU/L (ref 44–121)
ALT SERPL-CCNC: 34 IU/L (ref 0–44)
AST SERPL-CCNC: 23 IU/L (ref 0–40)
BILIRUB SERPL-MCNC: 0.4 MG/DL (ref 0–1.2)
BUN SERPL-MCNC: 14 MG/DL (ref 6–24)
BUN/CREAT SERPL: 14 (ref 9–20)
CALCIUM SERPL-MCNC: 9.6 MG/DL (ref 8.7–10.2)
CHLORIDE SERPL-SCNC: 98 MMOL/L (ref 96–106)
CHOLEST SERPL-MCNC: 152 MG/DL (ref 100–199)
CO2 SERPL-SCNC: 21 MMOL/L (ref 20–29)
CREAT SERPL-MCNC: 0.99 MG/DL (ref 0.76–1.27)
CREAT UR-MCNC: 92.3 MG/DL
EGFRCR SERPLBLD CKD-EPI 2021: 89 ML/MIN/1.73
ERYTHROCYTE [DISTWIDTH] IN BLOOD BY AUTOMATED COUNT: 13.3 % (ref 11.6–15.4)
GLOBULIN SER CALC-MCNC: 2.2 G/DL (ref 1.5–4.5)
GLUCOSE SERPL-MCNC: 64 MG/DL (ref 70–99)
HCT VFR BLD AUTO: 43.4 % (ref 37.5–51)
HDLC SERPL-MCNC: 39 MG/DL
HGB BLD-MCNC: 14.8 G/DL (ref 13–17.7)
LDLC SERPL CALC-MCNC: 68 MG/DL (ref 0–99)
MCH RBC QN AUTO: 28.2 PG (ref 26.6–33)
MCHC RBC AUTO-ENTMCNC: 34.1 G/DL (ref 31.5–35.7)
MCV RBC AUTO: 83 FL (ref 79–97)
MICROALBUMIN UR-MCNC: <3 UG/ML
PLATELET # BLD AUTO: 257 X10E3/UL (ref 150–450)
POTASSIUM SERPL-SCNC: 3.8 MMOL/L (ref 3.5–5.2)
PROT SERPL-MCNC: 6.8 G/DL (ref 6–8.5)
PSA SERPL-MCNC: 1 NG/ML (ref 0–4)
RBC # BLD AUTO: 5.25 X10E6/UL (ref 4.14–5.8)
SODIUM SERPL-SCNC: 136 MMOL/L (ref 134–144)
TRIGL SERPL-MCNC: 281 MG/DL (ref 0–149)
TSH SERPL DL<=0.005 MIU/L-ACNC: 1.56 UIU/ML (ref 0.45–4.5)
VLDLC SERPL CALC-MCNC: 45 MG/DL (ref 5–40)
WBC # BLD AUTO: 8.6 X10E3/UL (ref 3.4–10.8)

## 2024-02-20 DIAGNOSIS — E11.9 TYPE 2 DIABETES MELLITUS WITHOUT COMPLICATION, WITHOUT LONG-TERM CURRENT USE OF INSULIN: ICD-10-CM

## 2024-02-20 RX ORDER — EMPAGLIFLOZIN 25 MG/1
25 TABLET, FILM COATED ORAL DAILY
Qty: 90 TABLET | Refills: 1 | OUTPATIENT
Start: 2024-02-20

## 2024-04-07 DIAGNOSIS — I10 ESSENTIAL HYPERTENSION: ICD-10-CM

## 2024-04-07 DIAGNOSIS — E11.9 TYPE 2 DIABETES MELLITUS WITHOUT COMPLICATION, WITHOUT LONG-TERM CURRENT USE OF INSULIN: ICD-10-CM

## 2024-04-07 DIAGNOSIS — E78.2 MIXED HYPERLIPIDEMIA: ICD-10-CM

## 2024-04-08 DIAGNOSIS — E78.2 MIXED HYPERLIPIDEMIA: ICD-10-CM

## 2024-04-08 DIAGNOSIS — I10 ESSENTIAL HYPERTENSION: ICD-10-CM

## 2024-04-08 DIAGNOSIS — E11.9 TYPE 2 DIABETES MELLITUS WITHOUT COMPLICATION, WITHOUT LONG-TERM CURRENT USE OF INSULIN: ICD-10-CM

## 2024-04-08 RX ORDER — LISINOPRIL 20 MG/1
20 TABLET ORAL DAILY
Qty: 90 TABLET | Refills: 0 | OUTPATIENT
Start: 2024-04-08

## 2024-04-08 RX ORDER — METFORMIN HYDROCHLORIDE 500 MG/1
TABLET, EXTENDED RELEASE ORAL
Qty: 360 TABLET | Refills: 0 | OUTPATIENT
Start: 2024-04-08

## 2024-04-08 RX ORDER — TERAZOSIN 2 MG/1
2 CAPSULE ORAL EVERY 12 HOURS SCHEDULED
Qty: 180 CAPSULE | Refills: 0 | OUTPATIENT
Start: 2024-04-08

## 2024-04-08 RX ORDER — LISINOPRIL AND HYDROCHLOROTHIAZIDE 25; 20 MG/1; MG/1
1 TABLET ORAL DAILY
Qty: 90 TABLET | Refills: 0 | OUTPATIENT
Start: 2024-04-08

## 2024-04-08 RX ORDER — ATORVASTATIN CALCIUM 20 MG/1
20 TABLET, FILM COATED ORAL NIGHTLY
Qty: 90 TABLET | Refills: 0 | OUTPATIENT
Start: 2024-04-08

## 2024-04-08 RX ORDER — AMLODIPINE BESYLATE 10 MG/1
10 TABLET ORAL DAILY
Qty: 90 TABLET | Refills: 0 | OUTPATIENT
Start: 2024-04-08

## 2024-04-09 DIAGNOSIS — I10 ESSENTIAL HYPERTENSION: ICD-10-CM

## 2024-04-09 DIAGNOSIS — E78.2 MIXED HYPERLIPIDEMIA: ICD-10-CM

## 2024-04-09 DIAGNOSIS — E11.9 TYPE 2 DIABETES MELLITUS WITHOUT COMPLICATION, WITHOUT LONG-TERM CURRENT USE OF INSULIN: ICD-10-CM

## 2024-04-09 RX ORDER — TERAZOSIN 2 MG/1
2 CAPSULE ORAL EVERY 12 HOURS SCHEDULED
Qty: 180 CAPSULE | Refills: 1 | OUTPATIENT
Start: 2024-04-09

## 2024-04-09 RX ORDER — ICOSAPENT ETHYL 1000 MG/1
2 CAPSULE ORAL DAILY
Qty: 180 CAPSULE | Refills: 1 | OUTPATIENT
Start: 2024-04-09

## 2024-04-09 RX ORDER — BLOOD-GLUCOSE SENSOR
12 EACH MISCELLANEOUS
Qty: 6 EACH | Refills: 3 | OUTPATIENT
Start: 2024-04-09

## 2024-04-09 RX ORDER — LISINOPRIL 20 MG/1
20 TABLET ORAL DAILY
Qty: 90 TABLET | Refills: 1 | OUTPATIENT
Start: 2024-04-09

## 2024-04-09 RX ORDER — AMLODIPINE BESYLATE 10 MG/1
10 TABLET ORAL DAILY
Qty: 90 TABLET | Refills: 1 | OUTPATIENT
Start: 2024-04-09

## 2024-04-09 RX ORDER — ATORVASTATIN CALCIUM 20 MG/1
20 TABLET, FILM COATED ORAL NIGHTLY
Qty: 90 TABLET | Refills: 1 | OUTPATIENT
Start: 2024-04-09

## 2024-04-09 RX ORDER — LISINOPRIL AND HYDROCHLOROTHIAZIDE 25; 20 MG/1; MG/1
1 TABLET ORAL DAILY
Qty: 90 TABLET | Refills: 1 | OUTPATIENT
Start: 2024-04-09

## 2024-04-09 RX ORDER — METFORMIN HYDROCHLORIDE 500 MG/1
2000 TABLET, EXTENDED RELEASE ORAL
Qty: 360 TABLET | Refills: 1 | OUTPATIENT
Start: 2024-04-09

## 2024-04-09 RX ORDER — GLYBURIDE 2.5 MG/1
5 TABLET ORAL 2 TIMES DAILY WITH MEALS
Qty: 180 TABLET | Refills: 1 | OUTPATIENT
Start: 2024-04-09

## 2024-06-17 DIAGNOSIS — E11.9 TYPE 2 DIABETES MELLITUS WITHOUT COMPLICATION, WITHOUT LONG-TERM CURRENT USE OF INSULIN: ICD-10-CM

## 2024-06-17 RX ORDER — EMPAGLIFLOZIN 25 MG/1
25 TABLET, FILM COATED ORAL DAILY
Qty: 90 TABLET | Refills: 1 | OUTPATIENT
Start: 2024-06-17

## 2024-06-19 ENCOUNTER — OFFICE VISIT (OUTPATIENT)
Age: 59
End: 2024-06-19
Payer: COMMERCIAL

## 2024-06-19 VITALS
HEART RATE: 74 BPM | BODY MASS INDEX: 45.01 KG/M2 | TEMPERATURE: 98.9 F | SYSTOLIC BLOOD PRESSURE: 122 MMHG | DIASTOLIC BLOOD PRESSURE: 64 MMHG | OXYGEN SATURATION: 94 % | WEIGHT: 297 LBS | HEIGHT: 68 IN

## 2024-06-19 DIAGNOSIS — E78.2 MIXED HYPERLIPIDEMIA: ICD-10-CM

## 2024-06-19 DIAGNOSIS — R05.1 ACUTE COUGH: ICD-10-CM

## 2024-06-19 DIAGNOSIS — I10 ESSENTIAL HYPERTENSION: ICD-10-CM

## 2024-06-19 DIAGNOSIS — R73.9 HYPERGLYCEMIA: Primary | ICD-10-CM

## 2024-06-19 DIAGNOSIS — E11.9 TYPE 2 DIABETES MELLITUS WITHOUT COMPLICATION, WITHOUT LONG-TERM CURRENT USE OF INSULIN: ICD-10-CM

## 2024-06-19 DIAGNOSIS — K59.4 PROCTALGIA FUGAX: ICD-10-CM

## 2024-06-19 DIAGNOSIS — Z12.11 SCREENING FOR MALIGNANT NEOPLASM OF COLON: ICD-10-CM

## 2024-06-19 LAB
EXPIRATION DATE: ABNORMAL
HBA1C MFR BLD: 6.9 % (ref 4.5–5.7)
Lab: ABNORMAL

## 2024-06-19 PROCEDURE — 99214 OFFICE O/P EST MOD 30 MIN: CPT | Performed by: STUDENT IN AN ORGANIZED HEALTH CARE EDUCATION/TRAINING PROGRAM

## 2024-06-19 PROCEDURE — 83036 HEMOGLOBIN GLYCOSYLATED A1C: CPT | Performed by: STUDENT IN AN ORGANIZED HEALTH CARE EDUCATION/TRAINING PROGRAM

## 2024-06-19 RX ORDER — LISINOPRIL AND HYDROCHLOROTHIAZIDE 25; 20 MG/1; MG/1
1 TABLET ORAL DAILY
Qty: 90 TABLET | Refills: 1 | Status: SHIPPED | OUTPATIENT
Start: 2024-06-19

## 2024-06-19 RX ORDER — FENOFIBRATE 160 MG/1
160 TABLET ORAL DAILY
Qty: 90 TABLET | Refills: 1 | Status: SHIPPED | OUTPATIENT
Start: 2024-06-19

## 2024-06-19 RX ORDER — TERAZOSIN 2 MG/1
2 CAPSULE ORAL EVERY 12 HOURS SCHEDULED
Qty: 180 CAPSULE | Refills: 1 | Status: SHIPPED | OUTPATIENT
Start: 2024-06-19

## 2024-06-19 RX ORDER — ATORVASTATIN CALCIUM 80 MG/1
80 TABLET, FILM COATED ORAL NIGHTLY
Qty: 90 TABLET | Refills: 2 | Status: SHIPPED | OUTPATIENT
Start: 2024-06-19

## 2024-06-19 RX ORDER — GLYBURIDE 2.5 MG/1
5 TABLET ORAL 2 TIMES DAILY WITH MEALS
Qty: 180 TABLET | Refills: 1 | Status: SHIPPED | OUTPATIENT
Start: 2024-06-19

## 2024-06-19 RX ORDER — ICOSAPENT ETHYL 1000 MG/1
2 CAPSULE ORAL DAILY
Qty: 180 CAPSULE | Refills: 1 | Status: SHIPPED | OUTPATIENT
Start: 2024-06-19 | End: 2024-06-21 | Stop reason: SDUPTHER

## 2024-06-19 RX ORDER — ALBUTEROL SULFATE 90 UG/1
2 AEROSOL, METERED RESPIRATORY (INHALATION) EVERY 4 HOURS PRN
Qty: 18 G | Refills: 11 | Status: SHIPPED | OUTPATIENT
Start: 2024-06-19

## 2024-06-19 RX ORDER — METFORMIN HYDROCHLORIDE 500 MG/1
2000 TABLET, EXTENDED RELEASE ORAL
Qty: 360 TABLET | Refills: 1 | Status: SHIPPED | OUTPATIENT
Start: 2024-06-19

## 2024-06-19 RX ORDER — AMLODIPINE BESYLATE 10 MG/1
10 TABLET ORAL DAILY
Qty: 90 TABLET | Refills: 1 | Status: SHIPPED | OUTPATIENT
Start: 2024-06-19

## 2024-06-19 NOTE — PROGRESS NOTES
Office Note     Name: Harpreet Calvillo    : 1965     MRN: [id number]     Chief Complaint  [facility] and Med Refill (All meds 90 day supply)    Subjective     History of Present Illness:  Harpreet Calvillo is a 58 y.o. male who presents today for initial visit to establish care.  He is due for colonoscopy but primarily wants to establish for his chronic conditions. He has no specific symptomatic concerns today but we did go over all of his medications.  He has EDUARDO, T2DM, HTN, HLD, ED, and proctalgia fugax.  Had a colonoscopy 8 years ago with a polyp and was told he needed 5 yr follow up.      Past Medical History:   Past Medical History:   Diagnosis Date    Colon polyp     He found 1 during colonoscopy    Diabetes mellitus     ED (erectile dysfunction)     Hyperlipidemia     Hypertension     Rectal spasm     Sleep apnea        Past Surgical History:   Past Surgical History:   Procedure Laterality Date    COLONOSCOPY      HERNIA REPAIR      SHOULDER ACROMIOCLAVICULAR JOINT REPAIR         Immunizations:   Immunization History   Administered Date(s) Administered    COVID-19 (PFIZER) Purple Cap Monovalent 2021, 2021    Flu Vaccine Quad PF >36MO 2016    Fluzone (or Fluarix & Flulaval for VFC) >6mos 2020, 10/20/2022, 10/19/2023    Hepatitis A 2018    Influenza Quad Vaccine (Inpatient) 2017    Influenza, Unspecified 2020, 10/01/2021    Shingrix 10/25/2018, 10/15/2019    Tdap 2017        Medications:     Current Outpatient Medications:     albuterol sulfate  (90 Base) MCG/ACT inhaler, Inhale 2 puffs Every 4 (Four) Hours As Needed (anal spasms)., Disp: 18 g, Rfl: 11    amLODIPine (NORVASC) 10 MG tablet, Take 1 tablet by mouth Daily., Disp: 90 tablet, Rfl: 1    atorvastatin (LIPITOR) 80 MG tablet, Take 1 tablet by mouth Every Night., Disp: 90 tablet, Rfl: 2    ciclopirox (PENLAC) 8 % solution, Apply  topically to  the appropriate area as directed Every Night., Disp: 12 mL, Rfl: 1    Continuous Blood Gluc  device, 1 each Continuous. For use with FreeStyle Emmie 3 Sensor, Disp: 1 each, Rfl: 0    Continuous Blood Gluc Sensor (FreeStyle Emmie 3 Sensor) misc, 12 each Every 14 (Fourteen) Days., Disp: 6 each, Rfl: 3    empagliflozin (Jardiance) 25 MG tablet tablet, Take 1 tablet by mouth Daily., Disp: 90 tablet, Rfl: 1    fenofibrate 160 MG tablet, Take 1 tablet by mouth Daily., Disp: 90 tablet, Rfl: 1    glyburide (DIAbeta) 2.5 MG tablet, Take 2 tablets by mouth 2 (Two) Times a Day With Meals., Disp: 180 tablet, Rfl: 1    lisinopril (PRINIVIL,ZESTRIL) 20 MG tablet, Take 1 tablet by mouth Daily., Disp: 90 tablet, Rfl: 1    lisinopril-hydrochlorothiazide (PRINZIDE,ZESTORETIC) 20-25 MG per tablet, Take 1 tablet by mouth Daily., Disp: 90 tablet, Rfl: 1    metFORMIN ER (GLUCOPHAGE-XR) 500 MG 24 hr tablet, Take 4 tablets by mouth Daily With Breakfast., Disp: 360 tablet, Rfl: 1    nystatin-triamcinolone (MYCOLOG) 944607-8.1 UNIT/GM-% ointment, Apply 1 application topically to the appropriate area as directed 2 (Two) Times a Day., Disp: 30 g, Rfl: 0    sildenafil (REVATIO) 20 MG tablet, Take 5 tablets by mouth Daily As Needed (ED)., Disp: 90 tablet, Rfl: 2    terazosin (HYTRIN) 2 MG capsule, Take 1 capsule by mouth Every 12 (Twelve) Hours., Disp: 180 capsule, Rfl: 1    Vascepa 1 g capsule capsule, Take 2 g by mouth Daily., Disp: 180 capsule, Rfl: 1    Tirzepatide (Mounjaro) 2.5 MG/0.5ML solution pen-injector pen, Inject 0.5 mL under the skin into the appropriate area as directed 1 (One) Time Per Week., Disp: 2 mL, Rfl: 1    Allergies:   No Known Allergies    Family History:   Family History   Adopted: Yes   Problem Relation Age of Onset    Diabetes Mother     Diabetes Father        Social History:   Social History     Socioeconomic History    Marital status:    Tobacco Use    Smoking status: Never    Smokeless tobacco: Never  "  Vaping Use    Vaping status: Never Used   Substance and Sexual Activity    Alcohol use: Never    Drug use: Never    Sexual activity: Not Currently     Partners: Female         Objective     Vital Signs  /64   Pulse 74   Temp 98.9 °F (37.2 °C)   Ht 172.7 cm (67.99\")   Wt 135 kg (297 lb)   SpO2 94%   BMI 45.17 kg/m²   Estimated body mass index is 45.17 kg/m² as calculated from the following:    Height as of this encounter: 172.7 cm (67.99\").    Weight as of this encounter: 135 kg (297 lb).    Class 3 Severe Obesity (BMI >=40). Obesity-related health conditions include the following: obstructive sleep apnea, hypertension, diabetes mellitus, and dyslipidemias. Obesity is unchanged. BMI is is above average; BMI management plan is completed. We discussed Information on healthy weight added to patient's after visit summary.      Physical Exam  Constitutional:       General: He is not in acute distress.     Appearance: He is not toxic-appearing.   Cardiovascular:      Rate and Rhythm: Normal rate and regular rhythm.      Heart sounds: No murmur heard.     No friction rub. No gallop.   Pulmonary:      Effort: Pulmonary effort is normal.      Breath sounds: Normal breath sounds.   Abdominal:      General: Abdomen is flat. There is no distension.   Skin:     General: Skin is warm and dry.   Neurological:      Mental Status: He is alert.   Psychiatric:         Mood and Affect: Mood normal.         Behavior: Behavior normal.          Assessment and Plan     1. Hyperglycemia  A1c 6.9% today  - POC Glycosylated Hemoglobin (Hb A1C)    2. Mixed hyperlipidemia  Chronic stable  Will maximize statin as patient is going to no longer have Vascepa covered after July. Refill vascepa for now but will transition to fenofibrate after vascepa is out  - Vascepa 1 g capsule capsule; Take 2 g by mouth Daily.  Dispense: 180 capsule; Refill: 1  - atorvastatin (LIPITOR) 80 MG tablet; Take 1 tablet by mouth Every Night.  Dispense: 90 " tablet; Refill: 2  - fenofibrate 160 MG tablet; Take 1 tablet by mouth Daily.  Dispense: 90 tablet; Refill: 1    3. Screening for malignant neoplasm of colon  Order colonoscopy today  - Ambulatory Referral For Screening Colonoscopy    4. Mixed hyperlipidemia  Duplicate problem with number 2, disregard  - Vascepa 1 g capsule capsule; Take 2 g by mouth Daily.  Dispense: 180 capsule; Refill: 1  - atorvastatin (LIPITOR) 80 MG tablet; Take 1 tablet by mouth Every Night.  Dispense: 90 tablet; Refill: 2  - fenofibrate 160 MG tablet; Take 1 tablet by mouth Daily.  Dispense: 90 tablet; Refill: 1    5. Type 2 diabetes mellitus without complication, without long-term current use of insulin  Chronic stable,  -A1c at goal at 6.9%, patient on glyburide, metformin and jardiance.   -He has tried all of the above medications and has a BMI of 45.17. -He has comorbidities of hypertension, hyperlipidemia, EDUARDO which would benefit from weight loss and which place him at higher risk of ASCVD. For these reasons I will prescribe mounjaro therapy.    - empagliflozin (Jardiance) 25 MG tablet tablet; Take 1 tablet by mouth Daily.  Dispense: 90 tablet; Refill: 1  - metFORMIN ER (GLUCOPHAGE-XR) 500 MG 24 hr tablet; Take 4 tablets by mouth Daily With Breakfast.  Dispense: 360 tablet; Refill: 1    6. Essential hypertension  Chronic stable continue current medications  - amLODIPine (NORVASC) 10 MG tablet; Take 1 tablet by mouth Daily.  Dispense: 90 tablet; Refill: 1  - terazosin (HYTRIN) 2 MG capsule; Take 1 capsule by mouth Every 12 (Twelve) Hours.  Dispense: 180 capsule; Refill: 1  - lisinopril-hydrochlorothiazide (PRINZIDE,ZESTORETIC) 20-25 MG per tablet; Take 1 tablet by mouth Daily.  Dispense: 90 tablet; Refill: 1    7. Acute cough  Albuterol used for cough and proctalgia fugax, controls proctalgia symptoms very well  -Refill and continue albuterol  - albuterol sulfate  (90 Base) MCG/ACT inhaler; Inhale 2 puffs Every 4 (Four) Hours As  Needed (anal spasms).  Dispense: 18 g; Refill: 11    8. Proctalgia fugax  Albuterol used for cough and proctalgia fugax, controls proctalgia symptoms very well  -Refill and continue albuterol       Counseling was given to patient for the following topics: instructions for management.    Follow Up  Return in about 6 months (around 12/19/2024) for Annual physical.    MD CARLINE GarciaE [facility]  [address] [address] [phone]

## 2024-06-20 DIAGNOSIS — E11.9 TYPE 2 DIABETES MELLITUS WITHOUT COMPLICATION, WITHOUT LONG-TERM CURRENT USE OF INSULIN: ICD-10-CM

## 2024-06-20 RX ORDER — BLOOD-GLUCOSE SENSOR
12 EACH MISCELLANEOUS
Qty: 6 EACH | Refills: 3 | OUTPATIENT
Start: 2024-06-20

## 2024-06-21 DIAGNOSIS — E78.2 MIXED HYPERLIPIDEMIA: ICD-10-CM

## 2024-06-21 DIAGNOSIS — E11.9 TYPE 2 DIABETES MELLITUS WITHOUT COMPLICATION, WITHOUT LONG-TERM CURRENT USE OF INSULIN: ICD-10-CM

## 2024-06-21 RX ORDER — BLOOD-GLUCOSE SENSOR
12 EACH MISCELLANEOUS
Qty: 6 EACH | Refills: 3 | Status: SHIPPED | OUTPATIENT
Start: 2024-06-21

## 2024-06-21 RX ORDER — ICOSAPENT ETHYL 1000 MG/1
2 CAPSULE ORAL DAILY
Qty: 180 CAPSULE | Refills: 1 | Status: SHIPPED | OUTPATIENT
Start: 2024-06-21

## 2024-06-21 NOTE — TELEPHONE ENCOUNTER
Rx Refill Note  Requested Prescriptions     Pending Prescriptions Disp Refills    Continuous Glucose Sensor (FreeStyle Emmie 3 Sensor) misc 6 each 3     Sig: Use 12 each Every 14 (Fourteen) Days.    Vascepa 1 g capsule capsule 180 capsule 1     Sig: Take 2 g by mouth Daily.      Last office visit with prescribing clinician: 6/19/2024   Last telemedicine visit with prescribing clinician: Visit date not found   Next office visit with prescribing clinician: 12/20/2024                         Would you like a call back once the refill request has been completed: [] Yes [] No    If the office needs to give you a call back, can they leave a voicemail: [] Yes [] No    Noemi Woods MA  06/21/24, 15:35 EDT

## 2024-06-21 NOTE — TELEPHONE ENCOUNTER
"    Caller: Harpreet Calvillo \"Cristobal\"    Relationship: Self    Best call back number: [phone]     Requested Prescriptions:   Requested Prescriptions     Pending Prescriptions Disp Refills    Continuous Glucose Sensor (FreeStyle Emmie 3 Sensor) misc 6 each 3     Sig: Use 12 each Every 14 (Fourteen) Days.    Vascepa 1 g capsule capsule 180 capsule 1     Sig: Take 2 g by mouth Daily.      NEEDS THE GENERIC OF VASCEPA (ICOSAPENT) CALLED IN          Pharmacy where request should be sent: [address] [phone] [facility] [phone]      Last office visit with prescribing clinician: 6/19/2024   Last telemedicine visit with prescribing clinician: Visit date not found   Next office visit with prescribing clinician: 12/20/2024     Additional details provided by patient: PATIENT STATED HIS INS WILL NOT COVER THE VASCEPA BUT ADVISED THEY COVERED  THE GENERIC-ICOSAPENT. ANY ISSUES CONTACT THE PATIENT    Does the patient have less than a 3 day supply:  [] Yes  [x] No    Would you like a call back once the refill request has been completed: [] Yes [x] No    If the office needs to give you a call back, can they leave a voicemail: [] Yes [x] No    Romario Maurer Rep   06/21/24 15:31 EDT           "

## 2024-06-29 DIAGNOSIS — E11.9 TYPE 2 DIABETES MELLITUS WITHOUT COMPLICATION, WITHOUT LONG-TERM CURRENT USE OF INSULIN: ICD-10-CM

## 2024-07-01 RX ORDER — GLYBURIDE 5 MG/1
5 TABLET ORAL 2 TIMES DAILY WITH MEALS
Qty: 180 TABLET | Refills: 0 | OUTPATIENT
Start: 2024-07-01

## 2024-07-08 ENCOUNTER — PATIENT MESSAGE (OUTPATIENT)
Age: 59
End: 2024-07-08
Payer: COMMERCIAL

## 2024-07-13 DIAGNOSIS — E11.9 TYPE 2 DIABETES MELLITUS WITHOUT COMPLICATION, WITHOUT LONG-TERM CURRENT USE OF INSULIN: ICD-10-CM

## 2024-07-15 RX ORDER — BLOOD-GLUCOSE SENSOR
EACH MISCELLANEOUS
Qty: 6 EACH | Refills: 0 | OUTPATIENT
Start: 2024-07-15

## 2024-07-15 RX ORDER — BLOOD-GLUCOSE SENSOR
1 EACH MISCELLANEOUS
Qty: 6 EACH | Refills: 3 | Status: SHIPPED | OUTPATIENT
Start: 2024-07-15

## 2024-07-24 ENCOUNTER — PATIENT MESSAGE (OUTPATIENT)
Age: 59
End: 2024-07-24
Payer: COMMERCIAL

## 2024-07-24 NOTE — TELEPHONE ENCOUNTER
From: Harpreet Calvillo  To: Jaya Otero  Sent: 7/24/2024 10:56 AM EDT  Subject: Mounjaro    Adding this drug to my routine should put my A1c below six based on my glucose readings for the last 30 days. I am excited to be taking this.  The 2.5 dosage didn’t do a lot to suppress my appetite. I think 5.0 is possibly suppressing my appetite some. I have noticed no side effects except less frequent bowel movements.  Can I go ahead and just move up to the max designed for appetite control?

## 2024-08-08 ENCOUNTER — TELEPHONE (OUTPATIENT)
Dept: GASTROENTEROLOGY | Facility: CLINIC | Age: 59
End: 2024-08-08

## 2024-08-08 NOTE — TELEPHONE ENCOUNTER
"Caller: Harpreet Calvillo \"Cristobal\"    Relationship to patient: Self    Best call back number: [phone]    Chief complaint: WOULD LIKE COLONOSCOPY MOVED TO A MONDAY APPT. IF AT ALL POSSIBLE    Type of visit: COLONOSCOPY     Requested date:     If rescheduling, when is the original appointment: 8.27    Additional notes: ALSO HAS A QUESTION ABOUT STOPPING A MEDICATION FOR THE PROCEDURE.   "

## 2024-08-09 ENCOUNTER — PATIENT MESSAGE (OUTPATIENT)
Age: 59
End: 2024-08-09
Payer: COMMERCIAL

## 2024-08-09 NOTE — TELEPHONE ENCOUNTER
From: Harpreet Calvillo  To: Jaya Otero  Sent: 8/9/2024 11:18 AM EDT  Subject: Mounjaro    The last dosage that I was prescribed is doing wonders for my A1c. I’m even noticing A big Improvement in my A1c I have reduced my glyburide dosage in half    Can I can I move up to the next step in dosage?

## 2024-09-19 ENCOUNTER — PATIENT MESSAGE (OUTPATIENT)
Age: 59
End: 2024-09-19
Payer: COMMERCIAL

## 2024-10-09 ENCOUNTER — TELEPHONE (OUTPATIENT)
Dept: GASTROENTEROLOGY | Facility: CLINIC | Age: 59
End: 2024-10-09
Payer: COMMERCIAL

## 2024-10-09 NOTE — TELEPHONE ENCOUNTER
"Caller: Harpreet Calvillo \"Cristobal\"    Relationship to patient: Self    Best call back number: [phone]    Patient is needing: PT WANTING FOR INSTRUCTIONAL LETTER/PREP PACKET TO BE SENT FOR THE NEW SCHEDULED TIME OF 11.18. ALSO WANTED IT REITERATED THAT HE WILL BE TAKING THE TABLETS AND NOT THE SOLUTION FOR THE PREP.  "

## 2024-10-31 ENCOUNTER — PATIENT MESSAGE (OUTPATIENT)
Age: 59
End: 2024-10-31
Payer: COMMERCIAL

## 2024-10-31 RX ORDER — BLOOD-GLUCOSE SENSOR
EACH MISCELLANEOUS
Qty: 2 EACH | Refills: 11 | Status: SHIPPED | OUTPATIENT
Start: 2024-10-31 | End: 2024-11-01

## 2024-11-01 RX ORDER — BLOOD-GLUCOSE SENSOR
EACH MISCELLANEOUS
Qty: 2 EACH | Refills: 11 | Status: SHIPPED | OUTPATIENT
Start: 2024-11-01

## 2024-11-04 ENCOUNTER — PRIOR AUTHORIZATION (OUTPATIENT)
Age: 59
End: 2024-11-04
Payer: COMMERCIAL

## 2024-11-04 NOTE — TELEPHONE ENCOUNTER
Surescripts:    Drug:  FreeStyle Emmie 3 Plus Sensor device    Case Id  [id number]  Reference Id  [id number]    Sent to plan-Awaiting response  11/4/24

## 2024-11-04 NOTE — TELEPHONE ENCOUNTER
FreeStyle Emmie 3 Plus Sensor device was denied by plan.  -[phone]

## 2024-11-18 ENCOUNTER — OUTSIDE FACILITY SERVICE (OUTPATIENT)
Dept: GASTROENTEROLOGY | Facility: CLINIC | Age: 59
End: 2024-11-18
Payer: COMMERCIAL

## 2024-11-18 PROCEDURE — G0500 MOD SEDAT ENDO SERVICE >5YRS: HCPCS | Performed by: INTERNAL MEDICINE

## 2024-11-18 PROCEDURE — 45388 COLONOSCOPY W/ABLATION: CPT | Performed by: INTERNAL MEDICINE

## 2024-11-25 ENCOUNTER — TELEPHONE (OUTPATIENT)
Age: 59
End: 2024-11-25

## 2024-11-25 NOTE — TELEPHONE ENCOUNTER
PATIENT WAS SUPPOSED TO TAKE MOUNJARO INJECTION YESTERDAY. HE TRIED TO GIVE HIMSELF THE INJECTION IN HIS STOMACH BUT NEVER HEARD THE SECOND CLICK. HE HELD IT AGAINST HIS STOMACH FOR A WHILE, WHEN TAKING IT OFF SKIN A DROP OF 2 OF MEDICATION CAME OUT THEN STARTED SHOOTING MEDICATION OUT. SHOULD HE TAKE ANOTHER INJECTION OR WAIT UNTIL NEXT INJECTION IS DUE? HE DOES HAVE A LEFTOVER 12.5 MG PEN AT HOME, HE NORMALLY TAKES 15 MG NOW. HE DOESN'T THINK HE GOT MUCH OF THE MEDICATION, IF ANY. PLEASE ADVISE.

## 2024-11-26 DIAGNOSIS — K43.9 VENTRAL HERNIA WITHOUT OBSTRUCTION OR GANGRENE: Primary | ICD-10-CM

## 2024-12-20 DIAGNOSIS — E78.2 MIXED HYPERLIPIDEMIA: ICD-10-CM

## 2024-12-20 RX ORDER — ATORVASTATIN CALCIUM 80 MG/1
80 TABLET, FILM COATED ORAL
Qty: 90 TABLET | Refills: 2 | Status: SHIPPED | OUTPATIENT
Start: 2024-12-20

## 2024-12-20 NOTE — TELEPHONE ENCOUNTER
Rx Refill Note  Requested Prescriptions     Pending Prescriptions Disp Refills    atorvastatin (LIPITOR) 80 MG tablet [Pharmacy Med Name: ATORVASTATIN 80 MG TABLET] 90 tablet 2     Sig: TAKE 1 TABLET BY MOUTH AT BEDTIME      Last office visit with prescribing clinician: 6/19/2024   Last telemedicine visit with prescribing clinician: Visit date not found   Next office visit with prescribing clinician: 12/27/2024

## 2024-12-27 ENCOUNTER — OFFICE VISIT (OUTPATIENT)
Age: 59
End: 2024-12-27
Payer: COMMERCIAL

## 2024-12-27 VITALS
HEIGHT: 68 IN | HEART RATE: 84 BPM | DIASTOLIC BLOOD PRESSURE: 58 MMHG | BODY MASS INDEX: 38.52 KG/M2 | OXYGEN SATURATION: 96 % | WEIGHT: 254.2 LBS | SYSTOLIC BLOOD PRESSURE: 116 MMHG

## 2024-12-27 DIAGNOSIS — E53.8 B12 DEFICIENCY: ICD-10-CM

## 2024-12-27 DIAGNOSIS — E11.9 TYPE 2 DIABETES MELLITUS WITHOUT COMPLICATION, WITHOUT LONG-TERM CURRENT USE OF INSULIN: Primary | ICD-10-CM

## 2024-12-27 DIAGNOSIS — Z11.59 ENCOUNTER FOR HEPATITIS C SCREENING TEST FOR LOW RISK PATIENT: ICD-10-CM

## 2024-12-27 DIAGNOSIS — Z12.5 SCREENING FOR MALIGNANT NEOPLASM OF PROSTATE: ICD-10-CM

## 2024-12-27 DIAGNOSIS — I10 ESSENTIAL HYPERTENSION: ICD-10-CM

## 2024-12-27 DIAGNOSIS — E29.1 MALE HYPOGONADISM: ICD-10-CM

## 2024-12-27 DIAGNOSIS — E78.2 MIXED HYPERLIPIDEMIA: ICD-10-CM

## 2024-12-27 LAB
EXPIRATION DATE: ABNORMAL
HBA1C MFR BLD: 6.6 % (ref 4.5–5.7)
Lab: ABNORMAL

## 2024-12-27 PROCEDURE — 83036 HEMOGLOBIN GLYCOSYLATED A1C: CPT | Performed by: STUDENT IN AN ORGANIZED HEALTH CARE EDUCATION/TRAINING PROGRAM

## 2024-12-27 PROCEDURE — 99396 PREV VISIT EST AGE 40-64: CPT | Performed by: STUDENT IN AN ORGANIZED HEALTH CARE EDUCATION/TRAINING PROGRAM

## 2024-12-27 RX ORDER — LISINOPRIL AND HYDROCHLOROTHIAZIDE 20; 25 MG/1; MG/1
1 TABLET ORAL DAILY
Qty: 90 TABLET | Refills: 1 | Status: SHIPPED | OUTPATIENT
Start: 2024-12-27

## 2024-12-27 RX ORDER — TERAZOSIN 2 MG/1
2 CAPSULE ORAL EVERY 12 HOURS SCHEDULED
Qty: 180 CAPSULE | Refills: 1 | Status: SHIPPED | OUTPATIENT
Start: 2024-12-27

## 2024-12-27 RX ORDER — CIPROFLOXACIN AND DEXAMETHASONE 3; 1 MG/ML; MG/ML
4 SUSPENSION/ DROPS AURICULAR (OTIC) 2 TIMES DAILY
Qty: 7.5 ML | Refills: 2 | Status: SHIPPED | OUTPATIENT
Start: 2024-12-27

## 2024-12-27 RX ORDER — METFORMIN HYDROCHLORIDE 500 MG/1
2000 TABLET, EXTENDED RELEASE ORAL
Qty: 360 TABLET | Refills: 1 | Status: SHIPPED | OUTPATIENT
Start: 2024-12-27

## 2024-12-27 NOTE — PROGRESS NOTES
Office Note     Name: Harpreet Calvillo    : 1965     MRN: [id number]     Chief Complaint  Annual Exam and Diabetes    Subjective     History of Present Illness:  Harpreet Calvillo is a 58 y.o. male who presents today for annual health maintenance examination. He has been doing very well on mounjaro overall.  He has lost about 50 lbs and has noted significant improvement in his symptoms.  He has been on a busier schedule lately so exercise has fallen off over the last month or so. He stopped all of his cholesterol meds except for vascepa. Requests referral to urology for prostate symptoms/LUTS.  Notes clear fluid draining from his left ear at night.      Past Medical History:   Past Medical History:   Diagnosis Date    Colon polyp     He found 1 during colonoscopy    Diabetes mellitus     ED (erectile dysfunction)     Hyperlipidemia     Hypertension     Rectal spasm     Sleep apnea        Past Surgical History:   Past Surgical History:   Procedure Laterality Date    COLONOSCOPY      HERNIA REPAIR      SHOULDER ACROMIOCLAVICULAR JOINT REPAIR         Immunizations:   Immunization History   Administered Date(s) Administered    COVID-19 (PFIZER) Purple Cap Monovalent 2021, 2021    Flu Vaccine Quad PF >36MO 2016    Fluzone  >6mos 2017    Fluzone (or Fluarix & Flulaval for VFC) >6mos 2020, 10/20/2022, 10/19/2023    Hepatitis A 2018    Influenza, Unspecified 2020, 10/01/2021    Shingrix 10/25/2018, 10/15/2019    Tdap 2017        Medications:     Current Outpatient Medications:     albuterol sulfate  (90 Base) MCG/ACT inhaler, Inhale 2 puffs Every 4 (Four) Hours As Needed (anal spasms)., Disp: 18 g, Rfl: 11    atorvastatin (LIPITOR) 80 MG tablet, TAKE 1 TABLET BY MOUTH AT BEDTIME, Disp: 90 tablet, Rfl: 2    ciclopirox (PENLAC) 8 % solution, Apply  topically to the appropriate area as directed Every Night., Disp: 12 mL, Rfl: 1    Continuous Blood Gluc   device, 1 each Continuous. For use with FreeStyle Emmie 3 Sensor, Disp: 1 each, Rfl: 0    Continuous Glucose Sensor (FreeStyle Emmie 3 Plus Sensor), Use 1 sensor Every 15 (Fifteen) Days., Disp: 2 each, Rfl: 11    empagliflozin (Jardiance) 25 MG tablet tablet, Take 1 tablet by mouth Daily., Disp: 90 tablet, Rfl: 1    fenofibrate 160 MG tablet, Take 1 tablet by mouth Daily., Disp: 90 tablet, Rfl: 1    lisinopril-hydrochlorothiazide (PRINZIDE,ZESTORETIC) 20-25 MG per tablet, Take 1 tablet by mouth Daily., Disp: 90 tablet, Rfl: 1    metFORMIN ER (GLUCOPHAGE-XR) 500 MG 24 hr tablet, Take 4 tablets by mouth Daily With Breakfast., Disp: 360 tablet, Rfl: 1    nystatin-triamcinolone (MYCOLOG) 540333-6.1 UNIT/GM-% ointment, Apply 1 application topically to the appropriate area as directed 2 (Two) Times a Day., Disp: 30 g, Rfl: 0    sildenafil (REVATIO) 20 MG tablet, Take 5 tablets by mouth Daily As Needed (ED)., Disp: 90 tablet, Rfl: 2    terazosin (HYTRIN) 2 MG capsule, Take 1 capsule by mouth Every 12 (Twelve) Hours., Disp: 180 capsule, Rfl: 1    Vascepa 1 g capsule capsule, Take 2 g by mouth Daily., Disp: 180 capsule, Rfl: 1    ciprofloxacin-dexAMETHasone (Ciprodex) 0.3-0.1 % otic suspension, Administer 4 drops into the left ear 2 (Two) Times a Day. Take for 2 weeks at a time, Disp: 7.5 mL, Rfl: 2    Tirzepatide 15 MG/0.5ML solution auto-injector, Inject 15 mg under the skin into the appropriate area as directed 1 (One) Time Per Week., Disp: 2 mL, Rfl: 0    Tirzepatide 15 MG/0.5ML solution auto-injector, Inject 15 mg under the skin into the appropriate area as directed 1 (One) Time Per Week., Disp: 2 mL, Rfl: 3    Allergies:   No Known Allergies    Family History:   Family History   Adopted: Yes   Problem Relation Age of Onset    Diabetes Mother     Diabetes Father        Social History:   Social History     Socioeconomic History    Marital status:    Tobacco Use    Smoking status: Never    Smokeless  "tobacco: Never   Vaping Use    Vaping status: Never Used   Substance and Sexual Activity    Alcohol use: Never    Drug use: Never    Sexual activity: Not Currently     Partners: Female         Objective     Vital Signs  /58 (BP Location: Right arm, Patient Position: Sitting, Cuff Size: Large Adult)   Pulse 84   Ht 172.7 cm (67.99\")   Wt 115 kg (254 lb 3.2 oz)   SpO2 96%   BMI 38.66 kg/m²   Estimated body mass index is 38.66 kg/m² as calculated from the following:    Height as of this encounter: 172.7 cm (67.99\").    Weight as of this encounter: 115 kg (254 lb 3.2 oz).            Physical Exam  Constitutional:       General: He is not in acute distress.     Appearance: He is not toxic-appearing.   Cardiovascular:      Rate and Rhythm: Normal rate and regular rhythm.      Heart sounds: No murmur heard.     No friction rub. No gallop.   Pulmonary:      Effort: Pulmonary effort is normal.      Breath sounds: Normal breath sounds.   Abdominal:      General: Abdomen is flat. There is no distension.   Skin:     General: Skin is warm and dry.   Neurological:      Mental Status: He is alert.   Psychiatric:         Mood and Affect: Mood normal.         Behavior: Behavior normal.          Assessment and Plan     1. Type 2 diabetes mellitus without complication, without long-term current use of insulin  Chronic stable  A1c at 6.6%, continue mounjaro, metformin, jardiance  -Check labs  - POC Glycosylated Hemoglobin (Hb A1C)  - CBC Auto Differential; Future  - Comprehensive Metabolic Panel; Future  - TSH Rfx On Abnormal To Free T4; Future  - empagliflozin (Jardiance) 25 MG tablet tablet; Take 1 tablet by mouth Daily.  Dispense: 90 tablet; Refill: 1  - metFORMIN ER (GLUCOPHAGE-XR) 500 MG 24 hr tablet; Take 4 tablets by mouth Daily With Breakfast.  Dispense: 360 tablet; Refill: 1    2. Essential hypertension  Chronic stable  -Has stopped amlodipine and extra lisinopril  -Continue below medications for now. If " lightheadedness develops advised him to let me know and we will decrease these  - lisinopril-hydrochlorothiazide (PRINZIDE,ZESTORETIC) 20-25 MG per tablet; Take 1 tablet by mouth Daily.  Dispense: 90 tablet; Refill: 1  - terazosin (HYTRIN) 2 MG capsule; Take 1 capsule by mouth Every 12 (Twelve) Hours.  Dispense: 180 capsule; Refill: 1    3. Mixed hyperlipidemia  Check lipids, he is holding fenofibrate and atorvastatin for now  - Lipid Panel; Future    4. Encounter for hepatitis C screening test for low risk patient  Check hep c one time screen  - Hepatitis C Antibody; Future    5. B12 deficiency  Check b12  - Vitamin B12; Future    6. Male hypogonadism  Prior low testosterone, fasting, will check level  - Testosterone; Future    7. Screening for malignant neoplasm of prostate  Check PSA, refer to urology at patient request  - PSA SCREENING; Future       Counseling was given to patient for the following topics: instructions for management.    Follow Up  Return in about 6 months (around 6/27/2025) for Follow Up.    Sunday Otero MD  [facility]  [address] [address] [phone]

## 2025-01-02 ENCOUNTER — TELEPHONE (OUTPATIENT)
Age: 60
End: 2025-01-02
Payer: COMMERCIAL

## 2025-01-02 NOTE — TELEPHONE ENCOUNTER
"    Caller: Harpreet Calvillo \"Angelia\"    Relationship: Self    Best call back number: [phone]     Caller requesting test results: ANGELIA CALVILLO    What test was performed: BLOOD WORK    When was the test performed: [id number]    Where was the test performed: [facility]    Additional notes: PLEASE CALL WITH THE RESULTS     "

## 2025-01-05 LAB
ALBUMIN SERPL-MCNC: 4.3 G/DL (ref 3.8–4.9)
ALP SERPL-CCNC: 89 IU/L (ref 44–121)
ALT SERPL-CCNC: 19 IU/L (ref 0–44)
AST SERPL-CCNC: 14 IU/L (ref 0–40)
BASOPHILS # BLD AUTO: 0.1 X10E3/UL (ref 0–0.2)
BASOPHILS NFR BLD AUTO: 1 %
BILIRUB SERPL-MCNC: 0.3 MG/DL (ref 0–1.2)
BUN SERPL-MCNC: 14 MG/DL (ref 6–24)
BUN/CREAT SERPL: 16 (ref 9–20)
CALCIUM SERPL-MCNC: 9.8 MG/DL (ref 8.7–10.2)
CHLORIDE SERPL-SCNC: 103 MMOL/L (ref 96–106)
CHOLEST SERPL-MCNC: 217 MG/DL (ref 100–199)
CO2 SERPL-SCNC: 23 MMOL/L (ref 20–29)
CREAT SERPL-MCNC: 0.86 MG/DL (ref 0.76–1.27)
EGFRCR SERPLBLD CKD-EPI 2021: 102 ML/MIN/1.73
EOSINOPHIL # BLD AUTO: 0.1 X10E3/UL (ref 0–0.4)
EOSINOPHIL NFR BLD AUTO: 1 %
ERYTHROCYTE [DISTWIDTH] IN BLOOD BY AUTOMATED COUNT: 13.6 % (ref 11.6–15.4)
GLOBULIN SER CALC-MCNC: 2.3 G/DL (ref 1.5–4.5)
GLUCOSE SERPL-MCNC: 117 MG/DL (ref 70–99)
HCT VFR BLD AUTO: 47.7 % (ref 37.5–51)
HCV AB SERPL QL IA: NORMAL
HCV IGG SERPL QL IA: NON REACTIVE
HDLC SERPL-MCNC: 39 MG/DL
HGB BLD-MCNC: 15.6 G/DL (ref 13–17.7)
IMM GRANULOCYTES # BLD AUTO: 0 X10E3/UL (ref 0–0.1)
IMM GRANULOCYTES NFR BLD AUTO: 0 %
LDLC SERPL CALC-MCNC: 115 MG/DL (ref 0–99)
LYMPHOCYTES # BLD AUTO: 1.8 X10E3/UL (ref 0.7–3.1)
LYMPHOCYTES NFR BLD AUTO: 22 %
MCH RBC QN AUTO: 28.4 PG (ref 26.6–33)
MCHC RBC AUTO-ENTMCNC: 32.7 G/DL (ref 31.5–35.7)
MCV RBC AUTO: 87 FL (ref 79–97)
MONOCYTES # BLD AUTO: 0.5 X10E3/UL (ref 0.1–0.9)
MONOCYTES NFR BLD AUTO: 6 %
NEUTROPHILS # BLD AUTO: 5.6 X10E3/UL (ref 1.4–7)
NEUTROPHILS NFR BLD AUTO: 70 %
PLATELET # BLD AUTO: 245 X10E3/UL (ref 150–450)
POTASSIUM SERPL-SCNC: 4.3 MMOL/L (ref 3.5–5.2)
PROT SERPL-MCNC: 6.6 G/DL (ref 6–8.5)
PSA SERPL-MCNC: 1.4 NG/ML (ref 0–4)
RBC # BLD AUTO: 5.5 X10E6/UL (ref 4.14–5.8)
SODIUM SERPL-SCNC: 140 MMOL/L (ref 134–144)
T4 FREE SERPL DIALY-MCNC: 1.2 NG/DL
TESTOST SERPL-MCNC: 216 NG/DL (ref 264–916)
TRIGL SERPL-MCNC: 360 MG/DL (ref 0–149)
TSH SERPL-ACNC: 1.4 UU/ML
VIT B12 SERPL-MCNC: 686 PG/ML (ref 232–1245)
VLDLC SERPL CALC-MCNC: 63 MG/DL (ref 5–40)
WBC # BLD AUTO: 8.1 X10E3/UL (ref 3.4–10.8)

## 2025-01-06 ENCOUNTER — PATIENT MESSAGE (OUTPATIENT)
Age: 60
End: 2025-01-06
Payer: COMMERCIAL

## 2025-01-07 ENCOUNTER — TELEPHONE (OUTPATIENT)
Age: 60
End: 2025-01-07
Payer: COMMERCIAL

## 2025-01-07 NOTE — TELEPHONE ENCOUNTER
CALLED AND LVM    RELAY    ----- Message from Jaya Otero  Results looked good overall.  Thyroid, blood counts, liver and kidney function were good.  Testosterone was still slightly low.  Your PSA was normal.  I would recommend seeing urology prior to starting testosterone because of the risk of worsening your urinary symptoms with testosterone.  If you would like to try testosterone prior to that, that is ok, but you will need an appointment to do a drug screen and sign a controlled substance agreement as it is a controlled substance.  Your cholesterol is still too high. I would still recommend atorvastatin and fenofibrate.

## 2025-01-08 RX ORDER — ICOSAPENT ETHYL 1 G/1
2 CAPSULE ORAL 2 TIMES DAILY WITH MEALS
Qty: 120 CAPSULE | Refills: 2 | Status: SHIPPED | OUTPATIENT
Start: 2025-01-08

## 2025-01-08 NOTE — TELEPHONE ENCOUNTER
"    Name: Harpreet Calvillo \"Cristobal\"    Relationship: Self    Best Callback Number: [phone]     HUB PROVIDED THE RELAY MESSAGE FROM THE OFFICE   PATIENT VOICED UNDERSTANDING AND HAS NO FURTHER QUESTIONS AT THIS TIME    ADDITIONAL INFORMATION:   "

## 2025-01-08 NOTE — TELEPHONE ENCOUNTER
2nd attempt  CALLED AND LVM     RELAY     ----- Message from Jaya Otero  Results looked good overall.  Thyroid, blood counts, liver and kidney function were good.  Testosterone was still slightly low.  Your PSA was normal.  I would recommend seeing urology prior to starting testosterone because of the risk of worsening your urinary symptoms with testosterone.  If you would like to try testosterone prior to that, that is ok, but you will need an appointment to do a drug screen and sign a controlled substance agreement as it is a controlled substance.  Your cholesterol is still too high. I would still recommend atorvastatin and fenofibrate.

## 2025-01-09 ENCOUNTER — APPOINTMENT (OUTPATIENT)
Facility: HOSPITAL | Age: 60
End: 2025-01-09
Payer: OTHER MISCELLANEOUS

## 2025-01-09 ENCOUNTER — HOSPITAL ENCOUNTER (EMERGENCY)
Facility: HOSPITAL | Age: 60
Discharge: HOME | End: 2025-01-09
Attending: EMERGENCY MEDICINE
Payer: OTHER MISCELLANEOUS

## 2025-01-09 VITALS
HEART RATE: 89 BPM | TEMPERATURE: 98.3 F | RESPIRATION RATE: 18 BRPM | SYSTOLIC BLOOD PRESSURE: 126 MMHG | OXYGEN SATURATION: 97 % | DIASTOLIC BLOOD PRESSURE: 78 MMHG | WEIGHT: 250 LBS | HEIGHT: 67 IN | BODY MASS INDEX: 39.24 KG/M2

## 2025-01-09 DIAGNOSIS — S29.011A MUSCLE STRAIN OF CHEST WALL, INITIAL ENCOUNTER: Primary | ICD-10-CM

## 2025-01-09 PROCEDURE — 71101 X-RAY EXAM UNILAT RIBS/CHEST: CPT

## 2025-01-09 PROCEDURE — 99283 EMERGENCY DEPT VISIT LOW MDM: CPT

## 2025-01-09 NOTE — FSED PROVIDER NOTE
"Subjective  History of Present Illness:    Presents emergency department with a complaint of pain in his right side he states he fell onto a cooler while loading his tractor trailer and preparing to head out he landed on his right side.  Initially had pain in his right knee which is resolved.  He is able to bear weight on the knee.  He also had discomfort in his wrists.  Did not hit head.  No loss of consciousness.    Nurses Notes reviewed and agree, including vitals, allergies, social history and prior medical history.     REVIEW OF SYSTEMS: All systems reviewed and not pertinent unless noted.    Past Medical History:   Diagnosis Date    Colon polyp 2016    He found 1 during colonoscopy    Diabetes mellitus     ED (erectile dysfunction)     Hyperlipidemia     Hypertension     Rectal spasm     Sleep apnea        Allergies:    Patient has no known allergies.      Past Surgical History:   Procedure Laterality Date    COLONOSCOPY      HERNIA REPAIR      SHOULDER ACROMIOCLAVICULAR JOINT REPAIR  2016         Social History     Socioeconomic History    Marital status:    Tobacco Use    Smoking status: Never    Smokeless tobacco: Never   Vaping Use    Vaping status: Never Used   Substance and Sexual Activity    Alcohol use: Never    Drug use: Never    Sexual activity: Not Currently     Partners: Female         Family History   Adopted: Yes   Problem Relation Age of Onset    Diabetes Mother     Diabetes Father        Objective  Physical Exam:  /78 (BP Location: Left arm, Patient Position: Sitting)   Pulse 89   Temp 98.3 °F (36.8 °C) (Oral)   Resp 18   Ht 170.2 cm (67\")   Wt 113 kg (250 lb)   SpO2 97%   BMI 39.16 kg/m²      Physical Exam  Chest:          Comments: Pain to palpation        Primary Survey    Airway: Patent and protected  Breathing: Symmetric bilaterally  Circulation: Mentating well, responsive        Constitutional: Nontoxic appearance.  Psychological: No abnormalities of mood " affect.  Head: Atraumatic  Eyes: Conjunctiva are non-injected. no scleral icterus.  ENT: No obvious congestion or obstruction noted  Neck: No obvious deformity.  ROM appears preserved  Chest: No deformity noted.  No paradoxical breathing noted tender to palpation over the lateral ribs on the right side  Respiratory: Respiratory effort was normal - no use of accessory respiratory muscles noted.  There is no stridor.  Cardiovascular: Perfusion appears preserved - mentating well RRR no murmurs  Gastrointestinal: Abdomen nondistended.  Genitourinary: Not examined  Lymphatic: Not examined  Back: Not examined  Musculoskeletal: Musculoskeletal system is grossly intact.  There is no obvious deformity.  Right knee is full range of motion with no edema, crepitus, no ligamentous laxity.  Upper extremities are neurovascular tact no crepitus.  No areas of point tenderness  Neurological: Face: No Asymmetry.  Gross motor movement is intact in all 4 extremities.  Walks and ambulates without difficulty.  Patient exhibits normal speech.  Skin: No Pallor no obvious bruising.  No obvious rash.        ED Course:    Lab Results (last 24 hours)       ** No results found for the last 24 hours. **             XR Ribs Right With PA Chest    Result Date: 1/9/2025  XR RIBS RIGHT W PA CHEST Date of Exam: 1/9/2025 1:09 PM EST Indication: Right rib pain after fall Comparison: None available. Findings: Cardiomediastinal silhouette is unremarkable.  No airspace disease, pneumothorax, nor pleural effusion. No acute osseous abnormality identified. There are right shoulder degenerative changes.     Impression: Impression: 1.No acute process identified. Electronically Signed: Leonid Briggs MD  1/9/2025 1:25 PM EST  Workstation ID: [id number]      No orders to display       Procedures    MDM    Patient arrives after fall.  No evidence of injury to the wrist or knee.  He is most concerned about his chest wall. no crepitus.  No difficulty breathing.   Differential includes strain versus fracture and/or pneumothorax.  Will obtain chest imaging.  ED Course as of 01/09/25 1338   Thu Jan 09, 2025   1336 No rib fractures or pneumothorax identified.  He likely has a strain of his chest wall.    Throughout the course of this patient's care, I had a discussion with the patient/family regarding diagnosis, diagnostic results, my clinical treatment plan, medications, and disposition.  At the conclusion of this encounter, I spent any necessary time at the bedside to explain the aftercare instructions, provide patient education, review the results of the evaluation/clinical findings, and my decision making to assure that the patient/family understand the plan of care.   Emphasis was placed on timely follow-up after discharge. I also discussed the potential for the development of a change in clinical condition resulting in an acute emergent condition requiring further evaluation, reconsideration of the current therapeutic plan, admission, or surgical intervention. I discussed that the data examined from today's encounter did not indicate the need for further workup, admission or the presence of an unstable medical condition.  I encouraged the patient to return to the emergency department immediately for ANY concerns, worsening, new complaints, or if symptoms persist and unable to seek follow-up in a timely fashion.  The patient/family expressed understanding and agreement with this plan.     [] [KEILA]      ED Course User Index  [KEILA] Med Best MD        Medications - No data to display    HEART SCORE   No data recorded           -----  ED Disposition       ED Disposition   Discharge    Condition   Stable    Comment   --             Final diagnoses:   Muscle strain of chest wall, initial encounter      Your Follow-Up Providers       Jaya Otero MD.    Specialty: Family Medicine  [address] [id number]  [phone]                        Contact information for after-discharge care    Follow-up information has not been specified.                    Your medication list        CONTINUE taking these medications        Instructions Last Dose Given Next Dose Due   albuterol sulfate  (90 Base) MCG/ACT inhaler  Commonly known as: PROVENTIL HFA;VENTOLIN HFA;PROAIR HFA      Inhale 2 puffs Every 4 (Four) Hours As Needed (anal spasms).       atorvastatin 80 MG tablet  Commonly known as: LIPITOR      TAKE 1 TABLET BY MOUTH AT BEDTIME       ciclopirox 8 % solution  Commonly known as: PENLAC      Apply  topically to the appropriate area as directed Every Night.       ciprofloxacin-dexAMETHasone 0.3-0.1 % otic suspension  Commonly known as: Ciprodex      Administer 4 drops into the left ear 2 (Two) Times a Day. Take for 2 weeks at a time       Continuous Blood Gluc  device      1 each Continuous. For use with FreeStyle Emmie 3 Sensor       empagliflozin 25 MG tablet tablet  Commonly known as: Jardiance      Take 1 tablet by mouth Daily.       fenofibrate 160 MG tablet      Take 1 tablet by mouth Daily.       FreeStyle Emmie 3 Plus Sensor      Use 1 sensor Every 15 (Fifteen) Days.       icosapent ethyl 1 g capsule capsule  Commonly known as: VASCEPA      Take 2 g by mouth 2 (Two) Times a Day With Meals.       lisinopril-hydrochlorothiazide 20-25 MG per tablet  Commonly known as: PRINZIDE,ZESTORETIC      Take 1 tablet by mouth Daily.       metFORMIN  MG 24 hr tablet  Commonly known as: GLUCOPHAGE-XR      Take 4 tablets by mouth Daily With Breakfast.       nystatin-triamcinolone 947323-7.1 UNIT/GM-% ointment  Commonly known as: MYCOLOG      Apply 1 application topically to the appropriate area as directed 2 (Two) Times a Day.       sildenafil 20 MG tablet  Commonly known as: REVATIO      Take 5 tablets by mouth Daily As Needed (ED).       terazosin 2 MG capsule  Commonly known as: HYTRIN      Take 1 capsule by mouth Every 12 (Twelve) Hours.        Tirzepatide 15 MG/0.5ML solution auto-injector      Inject 15 mg under the skin into the appropriate area as directed 1 (One) Time Per Week.       Tirzepatide 15 MG/0.5ML solution auto-injector      Inject 15 mg under the skin into the appropriate area as directed 1 (One) Time Per Week.

## 2025-02-03 RX ORDER — ICOSAPENT ETHYL 1 G/1
2 CAPSULE ORAL 2 TIMES DAILY WITH MEALS
Qty: 360 CAPSULE | Refills: 2 | Status: SHIPPED | OUTPATIENT
Start: 2025-02-03

## 2025-02-03 NOTE — TELEPHONE ENCOUNTER
Rx Refill Note  Requested Prescriptions     Pending Prescriptions Disp Refills    icosapent ethyl (VASCEPA) 1 g capsule capsule 120 capsule 2     Sig: Take 2 g by mouth 2 (Two) Times a Day With Meals.      Last office visit with prescribing clinician: 12/27/2024   Last telemedicine visit with prescribing clinician: Visit date not found   Next office visit with prescribing clinician: 6/27/2025   they leave a voicemail: [] Yes [] No    Mitzi Deng MA  02/03/25, 10:05 EST

## 2025-03-17 ENCOUNTER — TELEMEDICINE (OUTPATIENT)
Dept: SLEEP MEDICINE | Age: 60
End: 2025-03-17
Payer: COMMERCIAL

## 2025-03-17 VITALS — WEIGHT: 250 LBS | HEIGHT: 68 IN | BODY MASS INDEX: 37.89 KG/M2

## 2025-03-17 DIAGNOSIS — R63.4 WEIGHT LOSS: ICD-10-CM

## 2025-03-17 DIAGNOSIS — E11.9 TYPE 2 DIABETES MELLITUS WITHOUT COMPLICATION, WITHOUT LONG-TERM CURRENT USE OF INSULIN: ICD-10-CM

## 2025-03-17 DIAGNOSIS — E66.01 MORBID (SEVERE) OBESITY DUE TO EXCESS CALORIES: ICD-10-CM

## 2025-03-17 DIAGNOSIS — G47.33 OSA (OBSTRUCTIVE SLEEP APNEA): Primary | ICD-10-CM

## 2025-03-17 PROCEDURE — 99213 OFFICE O/P EST LOW 20 MIN: CPT | Performed by: NURSE PRACTITIONER

## 2025-03-17 NOTE — PROGRESS NOTES
Sleep Clinic Video Visit Follow Up Note    The patient is located at their work address in [place], [place]. The patient presents today for telehealth service.  This service was conducted via audio/video technology through a secure [employer] video visit connection through Epic.  This provider is located in [facility].  Patient stated they are in a secure environment for the session.  Patient's condition being diagnosed/treated is appropriate for telemedicine.  The provider identified himself as well as his credentials.  The patient, and/or patient's guardian, consent to be seen remotely, and when consent is given they understanding that the consent allows for patient identifiable information to be sent to a third-party as needed.  They may refuse to be seen remotely at any time.  The electronic data is encrypted and password protected, and the patient and/or guardian has been advised of the potential risk to privacy not withstanding such measures.  Patient identifiers used: Name and date of birth.     You have chosen to receive care through a telehealth visit.  Do you consent to use a video connection for your medical care today? Yes     Mode of Visit: Video  Location of patient: -WORK-  Location of provider: +[facility]  You have chosen to receive care through a telehealth visit.  The patient has signed the video visit consent form.  The visit included audio and video interaction. No technical issues occurred during this visit.      Chief Complaint  Follow-up and compliance of PAP therapy    Subjective     History of Present Illness (from previous encounter on 11/1/2023):  Harpreet Calvillo is a 57 y.o. male who returns for follow-up and compliance of PAP therapy.  The pap report has been reviewed.  Overall usage is at 100% with compliance at 97%.  Sleep apnea is well controlled with an AHI of 0.3/H.  Patient averages 6 hours 41 minutes. (End copied text)    Interval History:  Harpreet Calvillo is a 59 y.o.  male returns for follow up and compliance of PAP therapy. The patient was last seen on 11/1/2023 by me. Overall the patient feels good with regard to therapy. The device appears to be working appropriately. On average the patient sleeps 6-8 hours per night. The patient wake 1 times per night. His device is making a wheezing noise, he wants a travel device, he wants to know if he still has sleep apnea with his weight loss of 50%.     The patient reports the following changes to their medical and medication history since they were last seen:  Terazosin      Further details are as follows:    [place] Scale is: 3/24      Weight:  Current Weight: 250 lb    Weight change in the last year:  loss: 50 lbs- on Monjaro    The patient's relevant past medical, surgical, family, and social history reviewed and updated in Epic as appropriate.    PMH:    Past Medical History:   Diagnosis Date    Colon polyp 2016    He found 1 during colonoscopy    Diabetes mellitus     ED (erectile dysfunction)     Hyperlipidemia     Hypertension     Rectal spasm     Sleep apnea      Past Surgical History:   Procedure Laterality Date    COLONOSCOPY      HERNIA REPAIR      SHOULDER ACROMIOCLAVICULAR JOINT REPAIR  2016       No Known Allergies    MEDS:  Prior to Admission medications    Medication Sig Start Date End Date Taking? Authorizing Provider   albuterol sulfate  (90 Base) MCG/ACT inhaler Inhale 2 puffs Every 4 (Four) Hours As Needed (anal spasms). 6/19/24   Jaya Otero MD   atorvastatin (LIPITOR) 80 MG tablet TAKE 1 TABLET BY MOUTH AT BEDTIME 12/20/24   Jaya Otero MD   ciclopirox (PENLAC) 8 % solution Apply  topically to the appropriate area as directed Every Night. 7/6/23   Diana Brooks APRN   ciprofloxacin-dexAMETHasone (Ciprodex) 0.3-0.1 % otic suspension Administer 4 drops into the left ear 2 (Two) Times a Day. Take for 2 weeks at a time 12/27/24   Jaya Otero MD   Continuous Blood Gluc  device 1 each  "Continuous. For use with FreeStyle Emmie 3 Sensor 7/14/23   Diana Brooks APRN   Continuous Glucose Sensor (FreeStyle Emmie 3 Plus Sensor) Use 1 sensor Every 15 (Fifteen) Days. 11/1/24   Jaya Otero MD   empagliflozin (Jardiance) 25 MG tablet tablet Take 1 tablet by mouth Daily. 12/27/24   Jaya Otero MD   fenofibrate 160 MG tablet Take 1 tablet by mouth Daily. 6/19/24   Jaya Otero MD   icosapent ethyl (VASCEPA) 1 g capsule capsule Take 2 g by mouth 2 (Two) Times a Day With Meals. 2/3/25   Jaya Otero MD   lisinopril-hydrochlorothiazide (PRINZIDE,ZESTORETIC) 20-25 MG per tablet Take 1 tablet by mouth Daily. 12/27/24   Jaya Otero MD   metFORMIN ER (GLUCOPHAGE-XR) 500 MG 24 hr tablet Take 4 tablets by mouth Daily With Breakfast. 12/27/24   Jaya Otero MD   nystatin-triamcinolone (MYCOLOG) 910832-3.1 UNIT/GM-% ointment Apply 1 application topically to the appropriate area as directed 2 (Two) Times a Day. 7/1/23   Diana Brooks APRN   sildenafil (REVATIO) 20 MG tablet Take 5 tablets by mouth Daily As Needed (ED). 7/6/23   Diana Brooks APRN   terazosin (HYTRIN) 2 MG capsule Take 1 capsule by mouth Every 12 (Twelve) Hours. 12/27/24   Jaya Otero MD   Tirzepatide 15 MG/0.5ML solution auto-injector Inject 15 mg under the skin into the appropriate area as directed 1 (One) Time Per Week. 12/27/24   Jaya Otero MD   Tirzepatide 15 MG/0.5ML solution auto-injector Inject 15 mg under the skin into the appropriate area as directed 1 (One) Time Per Week. 12/27/24   Jaya Otero MD         FH:  Family History   Adopted: Yes   Problem Relation Age of Onset    Diabetes Mother     Diabetes Father        Objective   Vital Signs:  Ht 172.7 cm (67.99\")   Wt 113 kg (250 lb)   BMI 38.02 kg/m²     Patient's (Body mass index is 38.02 kg/m².) indicates that they are obese (BMI >30) with health related conditions that include obstructive sleep apnea .          Physical " Exam  Constitutional:       Appearance: Normal appearance.   Neurological:      Mental Status: He is alert and oriented to person, place, and time.   Psychiatric:         Mood and Affect: Mood normal.         Behavior: Behavior normal.         Thought Content: Thought content normal.         Judgment: Judgment normal.               Result Review :           PAP Report:  AHI:0.1/h  Days of Usage: 30/30 (100%)  95th percentile leaks: 29.8 L/min  Number of Days Greater than 4 hours: 100%  Settings: V auto-Max IPAP 21.6 cm H2O, min EPAP 17.6 cm H2O, pressure support 3 cm H2O.       Assessment and Plan  Harpreet Calvillo is a 59 y.o. male who returns for follow-up and compliance of PAP therapy.  The pap report has been reviewed.  Overall usage and compliance are at 100%.  The patient averages 7 hours and 7 minutes of therapy.  Sleep apnea is well-controlled with an AHI of 0.1/h. I will refill the patient's supplies, and they will return for follow-up and compliance in 1 year or sooner should they have further questions or concerns.  Patient wishes to have another sleep study given significant weight loss.  I will place orders for this today.  Additionally, he notes that his current device is making a wheezing sound and he has been told by DME that they will not fix it.  Patient has been told by his insurance company that he may be eligible for new device in 3 years.  I will place orders for new device and have asked the patient to return for follow-up and compliance in 31-90 days.  He also wishes to have a travel device.  I will place orders for this today as well      Diagnoses and all orders for this visit:    1. EDUARDO (obstructive sleep apnea) (Primary)  -     PAP Therapy  -     Home Sleep Study; Future  -     PAP Therapy  -     PAP Therapy    2. Weight loss  -     Home Sleep Study; Future    3. Morbid (severe) obesity due to excess calories         The patient continues to use and benefit from PAP therapy.    1. The  patient was counseled regarding multimodal approach with healthy nutrition, healthy sleep, regular physical activity, social activities, counseling, and medications. Encouraged to practice lateral sleep position. Avoid alcohol and sedatives close to bedtime.     2.  We will refill supplies x1 year.  Return to clinic 1 year or sooner if symptoms warrant.  Patient gave verbal consent today for video visit. I have reviewed the results of my evaluation and impression and discussed my recommendations in detail with the patient.         Follow Up  Return for Video visit, 31 to 90 days after PAP setup.  Patient was given instructions and counseling regarding his condition or for health maintenance advice. Please see specific information pulled into the AVS if appropriate.       PHILIPPE Currie, [facility]-BC  Pulmonology, Critical Care, and Sleep Medicine

## 2025-03-18 RX ORDER — METFORMIN HYDROCHLORIDE 500 MG/1
2000 TABLET, EXTENDED RELEASE ORAL
Qty: 360 TABLET | Refills: 1 | Status: SHIPPED | OUTPATIENT
Start: 2025-03-18

## 2025-03-18 NOTE — TELEPHONE ENCOUNTER
Rx Refill Note    Requested Prescriptions     Pending Prescriptions Disp Refills    metFORMIN ER (GLUCOPHAGE-XR) 500 MG 24 hr tablet 360 tablet 1     Sig: Take 4 tablets by mouth Daily With Breakfast.      Last office visit with prescribing clinician: 12/27/2024   Last telemedicine visit with prescribing clinician: Visit date not found   Next office visit with prescribing clinician: 6/27/2025     Dion Flor MA  03/18/25, 08:35 EDT

## 2025-05-19 ENCOUNTER — TELEPHONE (OUTPATIENT)
Dept: UROLOGY | Facility: CLINIC | Age: 60
End: 2025-05-19
Payer: COMMERCIAL

## 2025-05-19 NOTE — TELEPHONE ENCOUNTER
Patient had questions on what to expect during his appointment and if he should be concerned about his lab work. Answered all questions for patient and he is prepared for his appointment now.

## 2025-05-19 NOTE — TELEPHONE ENCOUNTER
"  Caller: Harrpeet Calvillo \"Cristobal\"    Relationship: Self    Best call back number:   Telephone Information:   Mobile [phone]        What is the best time to reach you: ANYTIME    What was the call regarding: PT HAS SOME CONCERNS REGARDING HIS DX AND INSISTED ON A CALL BACK PRIOR TO APPT.  PT SCHEDULED 07/14/25 WITH PHILIPPE BOLES.  PLEASE CALL WHEN AVAILABLE.  THANK YOU.    "

## 2025-06-10 RX ORDER — TIRZEPATIDE 15 MG/.5ML
INJECTION, SOLUTION SUBCUTANEOUS
Qty: 15 ML | Refills: 1 | Status: SHIPPED | OUTPATIENT
Start: 2025-06-10

## 2025-06-10 NOTE — TELEPHONE ENCOUNTER
Rx Refill Note  Requested Prescriptions     Pending Prescriptions Disp Refills    Mounjaro 15 MG/0.5ML solution auto-injector [Pharmacy Med Name: MOUNJARO 15 MG/0.5 ML PEN]  1     Sig: INJECT 15 MG UNDER THE SKIN INTO THE APPROPRIATE AREA AS DIRECTED 1 (ONE) TIME PER WEEK.      Last office visit with prescribing clinician: 12/27/2024   Last telemedicine visit with prescribing clinician: Visit date not found   Next office visit with prescribing clinician: 6/27/2025     Noelle Solroio MA  06/10/25, 07:42 EDT    Duplicate therapy

## 2025-06-13 DIAGNOSIS — E78.2 MIXED HYPERLIPIDEMIA: ICD-10-CM

## 2025-06-13 RX ORDER — FENOFIBRATE 160 MG/1
160 TABLET ORAL DAILY
Qty: 90 TABLET | Refills: 1 | Status: SHIPPED | OUTPATIENT
Start: 2025-06-13

## 2025-06-27 ENCOUNTER — PRIOR AUTHORIZATION (OUTPATIENT)
Age: 60
End: 2025-06-27
Payer: COMMERCIAL

## 2025-06-27 ENCOUNTER — OFFICE VISIT (OUTPATIENT)
Age: 60
End: 2025-06-27
Payer: COMMERCIAL

## 2025-06-27 VITALS
DIASTOLIC BLOOD PRESSURE: 62 MMHG | WEIGHT: 258 LBS | BODY MASS INDEX: 39.1 KG/M2 | HEART RATE: 75 BPM | OXYGEN SATURATION: 95 % | HEIGHT: 68 IN | SYSTOLIC BLOOD PRESSURE: 118 MMHG | RESPIRATION RATE: 20 BRPM

## 2025-06-27 DIAGNOSIS — E11.9 TYPE 2 DIABETES MELLITUS WITHOUT COMPLICATION, WITHOUT LONG-TERM CURRENT USE OF INSULIN: ICD-10-CM

## 2025-06-27 DIAGNOSIS — E11.65 TYPE 2 DIABETES MELLITUS WITH HYPERGLYCEMIA, WITH LONG-TERM CURRENT USE OF INSULIN: Primary | ICD-10-CM

## 2025-06-27 DIAGNOSIS — N52.8 OTHER MALE ERECTILE DYSFUNCTION: ICD-10-CM

## 2025-06-27 DIAGNOSIS — E78.2 MIXED HYPERLIPIDEMIA: ICD-10-CM

## 2025-06-27 DIAGNOSIS — Z79.4 TYPE 2 DIABETES MELLITUS WITH HYPERGLYCEMIA, WITH LONG-TERM CURRENT USE OF INSULIN: Primary | ICD-10-CM

## 2025-06-27 DIAGNOSIS — I10 ESSENTIAL HYPERTENSION: ICD-10-CM

## 2025-06-27 DIAGNOSIS — R39.9 LOWER URINARY TRACT SYMPTOMS (LUTS): ICD-10-CM

## 2025-06-27 DIAGNOSIS — R05.1 ACUTE COUGH: ICD-10-CM

## 2025-06-27 LAB
ALBUMIN UR-MCNC: <1.2 MG/DL
CREAT UR-MCNC: 106.1 MG/DL
EXPIRATION DATE: ABNORMAL
HBA1C MFR BLD: 6.9 % (ref 4.5–5.7)
Lab: ABNORMAL
MICROALBUMIN/CREAT UR: NORMAL MG/G{CREAT}

## 2025-06-27 PROCEDURE — 82043 UR ALBUMIN QUANTITATIVE: CPT | Performed by: STUDENT IN AN ORGANIZED HEALTH CARE EDUCATION/TRAINING PROGRAM

## 2025-06-27 PROCEDURE — 82570 ASSAY OF URINE CREATININE: CPT | Performed by: STUDENT IN AN ORGANIZED HEALTH CARE EDUCATION/TRAINING PROGRAM

## 2025-06-27 RX ORDER — ATORVASTATIN CALCIUM 80 MG/1
80 TABLET, FILM COATED ORAL
Qty: 90 TABLET | Refills: 2 | Status: SHIPPED | OUTPATIENT
Start: 2025-06-27

## 2025-06-27 RX ORDER — LISINOPRIL AND HYDROCHLOROTHIAZIDE 20; 25 MG/1; MG/1
1 TABLET ORAL DAILY
Qty: 90 TABLET | Refills: 3 | Status: SHIPPED | OUTPATIENT
Start: 2025-06-27

## 2025-06-27 RX ORDER — TERAZOSIN 2 MG/1
2 CAPSULE ORAL EVERY 12 HOURS SCHEDULED
Qty: 180 CAPSULE | Refills: 1 | Status: SHIPPED | OUTPATIENT
Start: 2025-06-27

## 2025-06-27 RX ORDER — FENOFIBRATE 160 MG/1
160 TABLET ORAL DAILY
Qty: 90 TABLET | Refills: 1 | Status: SHIPPED | OUTPATIENT
Start: 2025-06-27

## 2025-06-27 RX ORDER — HYDROCHLOROTHIAZIDE 12.5 MG/1
CAPSULE ORAL
Qty: 6 EACH | Refills: 3 | Status: SHIPPED | OUTPATIENT
Start: 2025-06-27

## 2025-06-27 RX ORDER — ALBUTEROL SULFATE 90 UG/1
2 INHALANT RESPIRATORY (INHALATION) EVERY 4 HOURS PRN
Qty: 18 G | Refills: 11 | Status: SHIPPED | OUTPATIENT
Start: 2025-06-27

## 2025-06-27 RX ORDER — ICOSAPENT ETHYL 1 G/1
2 CAPSULE ORAL 2 TIMES DAILY WITH MEALS
Qty: 360 CAPSULE | Refills: 2 | Status: SHIPPED | OUTPATIENT
Start: 2025-06-27

## 2025-06-27 RX ORDER — SILDENAFIL CITRATE 20 MG/1
100 TABLET ORAL DAILY PRN
Qty: 90 TABLET | Refills: 2 | Status: SHIPPED | OUTPATIENT
Start: 2025-06-27

## 2025-06-27 RX ORDER — METFORMIN HYDROCHLORIDE 500 MG/1
2000 TABLET, EXTENDED RELEASE ORAL
Qty: 360 TABLET | Refills: 1 | Status: SHIPPED | OUTPATIENT
Start: 2025-06-27

## 2025-06-27 NOTE — TELEPHONE ENCOUNTER
Key: [id number]    Drug:  Sildenafil Citrate (PAH) 20MG tablets    Sent to plan-Awaiting response  6/27/25

## 2025-06-27 NOTE — PROGRESS NOTES
Office Note     Name: Harpreet Calvillo    : 1965     MRN: [id number]     Chief Complaint  Diabetes, Hypertension, and Hyperlipidemia    Subjective     History of Present Illness:  Harpreet Calvillo is a 59 y.o. male who presents today for follow-up of chronic medical conditions including diabetes hypertension hyperlipidemia.  He is doing well on his current medications.  He has not lost much more weight at this point.  He is still getting shetty easier but has plenty of cravings for food.  He is also struggled with increasing exercise.  He has had some arm pain on the right c/w tennis elbow. He has a new mole on the dorsal foot.   no other specific concerns at this time.    Past Medical History:   Past Medical History:   Diagnosis Date    Colon polyp     He found 1 during colonoscopy    Diabetes mellitus     ED (erectile dysfunction)     Hyperlipidemia     Hypertension     Rectal spasm     Sleep apnea        Past Surgical History:   Past Surgical History:   Procedure Laterality Date    COLONOSCOPY      HERNIA REPAIR      SHOULDER ACROMIOCLAVICULAR JOINT REPAIR         Immunizations:   Immunization History   Administered Date(s) Administered    COVID-19 (PFIZER) Purple Cap Monovalent 2021, 2021    Flu Vaccine Quad PF >36MO 2016    Fluzone  >6mos 2017    Fluzone (or Fluarix & Flulaval for VFC) >6mos 2020, 10/20/2022, 10/19/2023    Hepatitis A 2018    Influenza, Unspecified 2020, 10/01/2021    Shingrix 10/25/2018, 10/15/2019    Tdap 2017        Medications:     Current Outpatient Medications:     albuterol sulfate  (90 Base) MCG/ACT inhaler, Inhale 2 puffs Every 4 (Four) Hours As Needed (anal spasms)., Disp: 18 g, Rfl: 11    atorvastatin (LIPITOR) 80 MG tablet, Take 1 tablet by mouth every night at bedtime., Disp: 90 tablet, Rfl: 2    ciclopirox (PENLAC) 8 % solution, Apply  topically to the appropriate area as directed Every Night., Disp: 12 mL,  Rfl: 1    ciprofloxacin-dexAMETHasone (Ciprodex) 0.3-0.1 % otic suspension, Administer 4 drops into the left ear 2 (Two) Times a Day. Take for 2 weeks at a time, Disp: 7.5 mL, Rfl: 2    Continuous Blood Gluc  device, 1 each Continuous. For use with FreeStyle Emmie 3 Sensor, Disp: 1 each, Rfl: 0    Continuous Glucose Sensor (FreeStyle Emmie 3 Plus Sensor), Use 1 sensor Every 15 (Fifteen) Days., Disp: 6 each, Rfl: 3    empagliflozin (Jardiance) 25 MG tablet tablet, Take 1 tablet by mouth Daily., Disp: 90 tablet, Rfl: 1    fenofibrate 160 MG tablet, Take 1 tablet by mouth Daily., Disp: 90 tablet, Rfl: 1    icosapent ethyl (VASCEPA) 1 g capsule capsule, Take 2 g by mouth 2 (Two) Times a Day With Meals., Disp: 360 capsule, Rfl: 2    lisinopril-hydrochlorothiazide (PRINZIDE,ZESTORETIC) 20-25 MG per tablet, Take 1 tablet by mouth Daily., Disp: 90 tablet, Rfl: 3    metFORMIN ER (GLUCOPHAGE-XR) 500 MG 24 hr tablet, Take 4 tablets by mouth Daily With Breakfast., Disp: 360 tablet, Rfl: 1    nystatin-triamcinolone (MYCOLOG) 338474-0.1 UNIT/GM-% ointment, Apply 1 application topically to the appropriate area as directed 2 (Two) Times a Day., Disp: 30 g, Rfl: 0    sildenafil (REVATIO) 20 MG tablet, Take 5 tablets by mouth Daily As Needed (ED)., Disp: 90 tablet, Rfl: 2    terazosin (HYTRIN) 2 MG capsule, Take 1 capsule by mouth Every 12 (Twelve) Hours., Disp: 180 capsule, Rfl: 1    Tirzepatide 15 MG/0.5ML solution auto-injector, Inject 15 mg under the skin into the appropriate area as directed 1 (One) Time Per Week., Disp: 6 mL, Rfl: 3    Allergies:   No Known Allergies    Family History:   Family History   Adopted: Yes   Problem Relation Age of Onset    Diabetes Mother     Diabetes Father        Social History:   Social History     Socioeconomic History    Marital status:    Tobacco Use    Smoking status: Never    Smokeless tobacco: Never   Vaping Use    Vaping status: Never Used   Substance and Sexual Activity     "Alcohol use: Never    Drug use: Never    Sexual activity: Not Currently     Partners: Female         Objective     Vital Signs  /62 (BP Location: Right arm, Patient Position: Sitting, Cuff Size: Adult)   Pulse 75   Resp 20   Ht 172.7 cm (67.99\")   Wt 117 kg (258 lb)   SpO2 95%   BMI 39.24 kg/m²   Estimated body mass index is 39.24 kg/m² as calculated from the following:    Height as of this encounter: 172.7 cm (67.99\").    Weight as of this encounter: 117 kg (258 lb).          Physical Exam  Constitutional:       General: He is not in acute distress.     Appearance: He is not toxic-appearing.   Cardiovascular:      Rate and Rhythm: Normal rate and regular rhythm.      Heart sounds: No murmur heard.     No friction rub. No gallop.   Pulmonary:      Effort: Pulmonary effort is normal.      Breath sounds: Normal breath sounds.   Abdominal:      General: Abdomen is flat. There is no distension.   Skin:     General: Skin is warm and dry.      Comments: Symmetric pigmentation with no red flags of pigmented lesions on dorsum of foot.     Neurological:      Mental Status: He is alert.   Psychiatric:         Mood and Affect: Mood normal.         Behavior: Behavior normal.          Assessment and Plan     1. Type 2 diabetes mellitus with hyperglycemia, with long-term current use of insulin  Chronic stable  A1c 6.9%, counseled patient on working on diet  Urine Micro today  Continue Freestyle rukhsana 3, continue mounjaro 15 mg, continue metformin and jardiance  - Microalbumin / Creatinine Urine Ratio - Urine, Clean Catch; Future  - POC Glycosylated Hemoglobin (Hb A1C)  - Microalbumin / Creatinine Urine Ratio - Urine, Clean Catch    2. Lower urinary tract symptoms (LUTS)  Check PSA   - PSA DIAGNOSTIC; Future    3. Other male erectile dysfunction  Chronic stable continue sildenafil   - sildenafil (REVATIO) 20 MG tablet; Take 5 tablets by mouth Daily As Needed (ED).  Dispense: 90 tablet; Refill: 2    4. Essential " hypertension  Chronic stable continue below medications   - lisinopril-hydrochlorothiazide (PRINZIDE,ZESTORETIC) 20-25 MG per tablet; Take 1 tablet by mouth Daily.  Dispense: 90 tablet; Refill: 3  - terazosin (HYTRIN) 2 MG capsule; Take 1 capsule by mouth Every 12 (Twelve) Hours.  Dispense: 180 capsule; Refill: 1    5. Type 2 diabetes mellitus without complication, without long-term current use of insulin  Duplicate with problem 1  - metFORMIN ER (GLUCOPHAGE-XR) 500 MG 24 hr tablet; Take 4 tablets by mouth Daily With Breakfast.  Dispense: 360 tablet; Refill: 1  - empagliflozin (Jardiance) 25 MG tablet tablet; Take 1 tablet by mouth Daily.  Dispense: 90 tablet; Refill: 1    6. Mixed hyperlipidemia  Chronic stable check labs next visit   - fenofibrate 160 MG tablet; Take 1 tablet by mouth Daily.  Dispense: 90 tablet; Refill: 1  - atorvastatin (LIPITOR) 80 MG tablet; Take 1 tablet by mouth every night at bedtime.  Dispense: 90 tablet; Refill: 2    7. Mixed hyperlipidemia  Continue above meds   - fenofibrate 160 MG tablet; Take 1 tablet by mouth Daily.  Dispense: 90 tablet; Refill: 1  - atorvastatin (LIPITOR) 80 MG tablet; Take 1 tablet by mouth every night at bedtime.  Dispense: 90 tablet; Refill: 2    8. Acute cough  Continue albuterol for P fugax  - albuterol sulfate  (90 Base) MCG/ACT inhaler; Inhale 2 puffs Every 4 (Four) Hours As Needed (anal spasms).  Dispense: 18 g; Refill: 11       Counseling was given to patient for the following topics: instructions for management.    Follow Up  Return in about 6 months (around 12/27/2025) for Follow Up.    Sunday Otero MD  [facility]  [address] [address] [phone]

## 2025-06-30 NOTE — TELEPHONE ENCOUNTER
Your plan only covers this drug when it is used for certain health conditions. Covered uses are  pulmonary arterial hypertension (PAH) and secondary Raynaud's phenomenon. Your plan does not  cover this drug for your health condition that your doctor told us you have. We reviewed the information  we had. Your request has been denied.